# Patient Record
Sex: FEMALE | Race: WHITE | Employment: FULL TIME | URBAN - NONMETROPOLITAN AREA
[De-identification: names, ages, dates, MRNs, and addresses within clinical notes are randomized per-mention and may not be internally consistent; named-entity substitution may affect disease eponyms.]

---

## 2017-07-12 ENCOUNTER — OFFICE VISIT (OUTPATIENT)
Dept: GASTROENTEROLOGY | Age: 57
End: 2017-07-12
Payer: OTHER GOVERNMENT

## 2017-07-12 VITALS
WEIGHT: 151 LBS | DIASTOLIC BLOOD PRESSURE: 80 MMHG | HEART RATE: 72 BPM | BODY MASS INDEX: 26.75 KG/M2 | HEIGHT: 63 IN | SYSTOLIC BLOOD PRESSURE: 120 MMHG | OXYGEN SATURATION: 96 %

## 2017-07-12 DIAGNOSIS — Z80.0 FAMILY HISTORY OF COLON CANCER: Primary | ICD-10-CM

## 2017-07-12 DIAGNOSIS — Z86.010 HISTORY OF COLON POLYPS: ICD-10-CM

## 2017-07-12 PROBLEM — Z86.0100 HISTORY OF COLON POLYPS: Status: ACTIVE | Noted: 2017-07-12

## 2017-07-12 PROCEDURE — 99214 OFFICE O/P EST MOD 30 MIN: CPT | Performed by: NURSE PRACTITIONER

## 2017-07-12 ASSESSMENT — ENCOUNTER SYMPTOMS
SORE THROAT: 0
VOICE CHANGE: 0
COUGH: 0
CONSTIPATION: 0
SHORTNESS OF BREATH: 0
CHEST TIGHTNESS: 0
BACK PAIN: 0
VOMITING: 0
DIARRHEA: 0
BLOOD IN STOOL: 0
ABDOMINAL DISTENTION: 0
NAUSEA: 0
ABDOMINAL PAIN: 0
RECTAL PAIN: 0

## 2019-11-05 ENCOUNTER — OFFICE VISIT (OUTPATIENT)
Dept: OTOLARYNGOLOGY | Facility: CLINIC | Age: 59
End: 2019-11-05

## 2019-11-05 VITALS
OXYGEN SATURATION: 97 % | WEIGHT: 144.8 LBS | BODY MASS INDEX: 25.66 KG/M2 | SYSTOLIC BLOOD PRESSURE: 88 MMHG | HEIGHT: 63 IN | RESPIRATION RATE: 18 BRPM | DIASTOLIC BLOOD PRESSURE: 60 MMHG | HEART RATE: 80 BPM | TEMPERATURE: 98.4 F

## 2019-11-05 DIAGNOSIS — J35.1 LINGUAL TONSIL HYPERTROPHY: ICD-10-CM

## 2019-11-05 DIAGNOSIS — R09.89 GLOBUS PHARYNGEUS: ICD-10-CM

## 2019-11-05 DIAGNOSIS — R43.9 SMELL AND TASTE DISORDER: Primary | ICD-10-CM

## 2019-11-05 PROCEDURE — 31575 DIAGNOSTIC LARYNGOSCOPY: CPT | Performed by: OTOLARYNGOLOGY

## 2019-11-05 PROCEDURE — 99204 OFFICE O/P NEW MOD 45 MIN: CPT | Performed by: OTOLARYNGOLOGY

## 2019-11-05 RX ORDER — ASPIRIN 81 MG/1
81 TABLET ORAL DAILY
COMMUNITY

## 2019-11-05 RX ORDER — OXYMETAZOLINE HYDROCHLORIDE 0.05 G/100ML
2 SPRAY NASAL 3 TIMES DAILY
Qty: 2 ML | Refills: 0 | Status: SHIPPED | OUTPATIENT
Start: 2019-11-05 | End: 2019-11-08

## 2019-11-05 RX ORDER — FLUTICASONE PROPIONATE 50 MCG
2 SPRAY, SUSPENSION (ML) NASAL 2 TIMES DAILY
Qty: 48 G | Refills: 3 | Status: SHIPPED | OUTPATIENT
Start: 2019-11-05

## 2019-11-05 NOTE — PROGRESS NOTES
Jose L Contreras Jr, MD     ENT PROCEDURE NOTE     Anesthesia: topical 4% tetracaine and oxymetazoline mix    Endoscopy Type: Flexible Laryngoscopy    Indications for Procedure:     Procedure Details:    The patient was placed in an upright position.  The laryngoscope was passed bilateral nasal passge.  The nasal cavities, nasopharynx, oropharynx, hypopharynx, and larynx were all examined.  Vocal cords were examined during respiration and phonation.  The following findings were noted:    Findings:   NASAL SCOPE FINDINGS:    Nasal endoscopy   NASALMUCOSA:    abnormal:        Bilateral- mildly bluish and boggy    NASAL PASSAGES:     abnormal   Bilateral- L>R narrowed    NASAL VALVE:     abnormal  Bilateral- mildly weak    SEPTUM:     mucosa abnormal   Bilateral- bluish, thin     deviation present:      deviated Left low moderate, more posterior   INFERIOR TURBINATES:     abnormal  Bilateral- mildly bluish and boggy    MIDDLE TURBINATES:     normal size, non-obstructing, no lesions, middle meatus non-obstructed, no lesions/polyps   MIDDLE MEATUS:      Normal, patent, no mucopus, non-surgical  Bilateral   SPHENO-ETHMOID RECESS:    normal, normal sphenoid ostia. no mucopus, non-surgical   NASOPHARYNX:     normal mucosa, normal secretions, Eustachian tubes normal, normal palate mobility, no lesions, adenoids appear normal   SECRETIONS:     normal amount, clear  LARYNGOSCOPY FINDINGS :    OROPHARYNX:     Lateral walls:         Redundant          BILATERAL    Posterior walls:         normal, no lesions    LINGUAL TONSILS:          BILATERAL: very enlarged and touching epiglottis    VALLECULA:        BILATERAL: tonsils blocking  EPIGLOTTIS:    Position: retroverted    Color: pale    Mucosa: mildly bluish    Shape: normal  HYPOPHARYNX:    Lateral walls:         BILATERAL: redundant    Posterior wall:        BILATERAL: mildly thick  ARYEPIGLOTTIC FOLDS:     normal mucosa, no lesions  ARYTENOIDS:    normal position, normal  size, normal mobility, no lesions, tower size normal  FALSE CORDS:     normal mucosa, no lesions, normal mobility  Bilateral  TRUE VOCAL FOLDS:      normal appearance, mobility, no lesions  Bilateral  GLOTTIS:     normal closure, with good cord apposition  SUBGLOTTIS:     unobstructed, patent, no lesions    Condition:  Stable.  Patient tolerated procedure well.    Complications:  None    Post-procedure instructions reviewed with Patient  Instructions documented in AVS for patient to review    Jose L Contreras Jr, MD  11/5/2019  4:03 PM

## 2019-11-05 NOTE — PROGRESS NOTES
Tammi Elmore MA   Patient Intake Note    Review of Systems  Review of Systems   Constitutional: Negative for chills, fatigue and fever.   HENT:        See HPI   Eyes: Negative for pain, discharge, redness and itching.   Respiratory: Positive for cough. Negative for choking and wheezing.    Gastrointestinal: Negative for diarrhea, nausea and vomiting.   Musculoskeletal: Positive for neck pain. Negative for neck stiffness.   Allergic/Immunologic: Positive for environmental allergies. Negative for food allergies.   Neurological: Positive for dizziness and headaches. Negative for weakness and light-headedness.   Hematological: Does not bruise/bleed easily.   Psychiatric/Behavioral: Positive for sleep disturbance.   All other systems reviewed and are negative.      QUALITY MEASURES    Tobacco Use: Screening and Cessation Intervention  Social History    Tobacco Use      Smoking status: Never Smoker      Smokeless tobacco: Never Used        Tammi Elmore MA  11/5/2019  3:44 PM

## 2019-11-05 NOTE — PATIENT INSTRUCTIONS
NASAL SALINE:  Use 2 puffs each nostril 4-6 times daily and more frequently if possible.  You can buy saline spray or you can make your own and use an old spray bottle to administer  Use a humidifier at bedside  Recipe for saline:d  Water                                 1 quart  Salt (table)                        1 tablespoon  Gylcerin (or Naty Syrup)    1 teaspoon  Sodium bicarbonate           1 teaspoon  Sprays or [place] pots are recommended    Nasal steroid use:  Using nasal steroids:  You will be prescribed one of the following nasal steroids: Flonase, Nasacort, Nasonex, Rhinocort, Qnasl, Zetonna  2 puffs each nostril 2 times daily  Start as soon as possible  If you are using Afrin for 3 days with the nasal steroid,  Use Afrin first and wait 10 minutes to allow the nose to open. Then administer nasal steroids.    Afrin use:  Use 2 puffs each nostril 3 times daily for 3 days only!!  Stop using after 3 days unless instructed to use longer      VITAMINS FOR SMELL AND TASTE:  Vitamin C 1 gram 2 times daily  Vit B complex 1 tablet 2 times daily  Zinc 50 mg 2 times daily    CT scan of sinus ordered    Thank you for enrolling in [facility]. Please follow the instructions below to securely access your online medical record. [facility] allows you to send messages to your doctor, view your test results, renew your prescriptions, schedule appointments, and more.    How Do I Sign Up?  1. In your Internet browser, go to [employer]  2. Click on the Sign Up Now link in the New User? box.   3. Enter your [facility] Activation Code exactly as it appears below. You will not need to use this code after you have completed the sign-up process. If you do not sign up before the expiration date, you must request a new code.  [facility] Activation Code: [email or web address]  Expires: 12/5/2019  4:05 PM    4. Enter the last four digits of your Social Security Number and your Date of Birth as indicated and click Next. You will be taken  to the next sign-up page.  5. Create a [email or web address] username. Think of one that is secure and easy to remember.  6. Create a [email or web address] password. You can change your password at any time.  7. Choose a security question, enter your answer, and click Next. This can be used to access [email or web address] if you forget your password.   8. Select your communication preference. Enter a valid e-mail address if you would like to receive e-mail notifications when new information is available in [email or web address].  9. Click Sign In. You can now view your medical record.     Additional Information  If you have questions, you can e-mail [email or web address], or call [phone] to talk to our [email or web address] staff. Remember, [email or web address] is NOT to be used for urgent needs. For medical emergencies, dial 911.

## 2019-11-05 NOTE — PROGRESS NOTES
Jose L Contreras Jr, MD     Chief Complaint   Patient presents with   • Swollen Glands     Pt has swollen glands and has lost taste and smell.        HISTORY OF PRESENT ILLNESS:   Accompanied by:   No one  Karuna Green is a  59 y.o. female with loss of smell and taste since tooth pulled in July.  She had tooth pulled. She has not discussed with dentist since dental surgery.  Noted loss of smell and taste immediately after procedure. She has not had return since then.  No change.  Tooth has healed.   She cannot smell Bovie smoke.  Weight- gaining.  Rx- none  She is frustrated.  Smoke- none  Drink- none  Healthy- no issues.  No blood thinners except 81 mg ASA daily.  Patient was sick 1 week prior to seeing dentist. She noted taste prior to the procedure but none after procedure.    Review of Systems  Reviewed per patient intake note and confirmed with patient    Past History:  Past Medical History:   Diagnosis Date   • Arthritis      Past Surgical History:   Procedure Laterality Date   • HYSTERECTOMY     • TUBAL ABDOMINAL LIGATION       Family History   Problem Relation Age of Onset   • Heart disease Mother    • COPD Mother    • Diabetes Mother    • Colon cancer Father    • Liver cancer Father    • Lung cancer Father      Social History     Tobacco Use   • Smoking status: Never Smoker   • Smokeless tobacco: Never Used   Substance Use Topics   • Alcohol use: No     Frequency: Never   • Drug use: No     Outpatient Medications Marked as Taking for the 11/5/19 encounter (Office Visit) with Jose L Contreras Jr., MD   Medication Sig Dispense Refill   • aspirin 81 MG EC tablet Take 81 mg by mouth Daily.     • DICLOFENAC SODIUM PO Take 15 mg by mouth 2 (Two) Times a Day. Takes 1 tablet daily.       Allergies:  Butorphanol; Codeine; Lisinopril; Morphine; and Other        Vital Signs:   Temp:  [98.4 °F (36.9 °C)] 98.4 °F (36.9 °C)  Heart Rate:  [80] 80  Resp:  [18] 18  BP: (88)/(60) 88/60    EXAMINATION:   CONSTITUTIONAL:     well nourished, well-developed, alert, oriented, in no acute distress     BODY HABITUS:    Normal body habitus    COMMUNICATION:    able to communicate normally, normal voice quality    HEAD:     Normocephalic, without obvious abnormality, atraumatic    FACE:    structure normal, no tenderness present, no lesions/masses, no evidence of trauma    SALIVARY GLANDS:    parotid glands with no tenderness, no swelling, no masses, submandibular glands with normal size, nontender     EYE:    ocular motility normal, eyelids normal, orbits normal, no proptosis, conjunctiva clear, sclera non-icteric, pupils equal, round, reactive to light and accomodation    HEARING:    response to conversational voice normal    EARS:    Otoscopic exam   normal pinna with no lesions, Canals normal size and shape, Tympanic membranes normal, Ossicular chain intact, Middle ear clear     NOSE EXTERNAL:    APPEARANCE: normal, straight, with good projection, no tenderness, no lesions, no tenderness, good nasal support, patent nares    NOSE INTERNAL:    Nasal endoscopy        See procedure note for details Bilateral    ORAL CAVITY:    Normal lips with no lesions, dentition normal for age, FOM intact without lesions and normal salivary flow, Mucosa intact without lesions, Hard and soft palate normal without lesions      Repair: poor, worn, yellow, broken stained    OROPHARYNX:    Direct examination  oropharyngeal mucosa normal, tonsil(s) with normal appearance      NECK:    normal appearance, no masses, no lesions, larynx normal mobility, trachea midline  thin    LYMPH NODES :    no adenopathy    THYROID:    no overt thyromegaly, no tenderness, nodules or mass present on palpation, position midline    CHEST/RESPIRATORY:    respiratory effort normal, no rales, rubs or wheezing, no stridor, normal appearance to chest    CARDIOVASCULAR:    regular rate and rhythm, no murmurs, gallups, no peripheral edema    NEURO/PSYCHIATRIC :    oriented appropriately  for age, mood normal, affect appropriate, cranial nerves intact grossly (unless specifically described), gait normal for age    RESULTS REVIEW:    I have reviewed the patients old records in the chart.        ASSESSMENT:      Diagnosis Plan   1. Smell and taste disorder  CT Sinus Without Contrast    sudden loss greater than 4 months ago  Viral induced sense of smell loss prior to procedure   2. Globus pharyngeus      Pushes on R carotid body   3. Lingual tonsil hypertrophy      Bilateral moderate           PLAN:     Conservative management.  Patient ha s2 issues. I feel URI prior to dental procedure caused the loss of smell. I will start nasal steroids and obtain CT to assess smell.  She has globus, enlarged lingual tonsils and prominent R carotid body. This is causing sensation in R throat.  I will se if nasal steroids help for now.  I will start Smell Vitamin protocol.  Flonase BID  Afrin x 3 days  Nasal saline  Smell vitamins  Ct ordered  MY CHART:  Patient is Encouraged to enroll in My Chart  Encouraged to review data and findings in My Chart  New Medications Ordered This Visit   Medications   • fluticasone (FLONASE) 50 MCG/ACT nasal spray     Si sprays into the nostril(s) as directed by provider 2 (Two) Times a Day.     Dispense:  48 g     Refill:  3   • oxymetazoline (AFRIN) 0.05 % nasal spray     Si sprays into the nostril(s) as directed by provider 3 (Three) Times a Day for 3 days. Use for 3 days then stop     Dispense:  2 mL     Refill:  0     Orders Placed This Encounter   Procedures   • CT Sinus Without Contrast          Patient understand(s) and agree(s) with the treatment plan as described.  Return After CT sinus, for Recheck smell, globus, lingual tonsils, Flex scope.       Jose L Contreras Jr, MD  19  4:02 PM

## 2019-11-13 ENCOUNTER — HOSPITAL ENCOUNTER (OUTPATIENT)
Dept: CT IMAGING | Facility: HOSPITAL | Age: 59
Discharge: HOME | End: 2019-11-13
Admitting: OTOLARYNGOLOGY

## 2019-11-13 DIAGNOSIS — R43.9 SMELL AND TASTE DISORDER: ICD-10-CM

## 2019-11-13 PROCEDURE — 70486 CT MAXILLOFACIAL W/O DYE: CPT

## 2019-12-04 ENCOUNTER — OFFICE VISIT (OUTPATIENT)
Dept: OTOLARYNGOLOGY | Facility: CLINIC | Age: 59
End: 2019-12-04

## 2019-12-04 VITALS
DIASTOLIC BLOOD PRESSURE: 90 MMHG | RESPIRATION RATE: 18 BRPM | OXYGEN SATURATION: 96 % | TEMPERATURE: 98.6 F | SYSTOLIC BLOOD PRESSURE: 132 MMHG | HEART RATE: 73 BPM | HEIGHT: 63 IN | WEIGHT: 145.2 LBS | BODY MASS INDEX: 25.73 KG/M2

## 2019-12-04 DIAGNOSIS — J35.1 LINGUAL TONSIL HYPERTROPHY: ICD-10-CM

## 2019-12-04 DIAGNOSIS — R43.9 SMELL AND TASTE DISORDER: Primary | ICD-10-CM

## 2019-12-04 DIAGNOSIS — J32.8 OTHER CHRONIC SINUSITIS: ICD-10-CM

## 2019-12-04 DIAGNOSIS — R09.89 GLOBUS PHARYNGEUS: ICD-10-CM

## 2019-12-04 PROCEDURE — 99213 OFFICE O/P EST LOW 20 MIN: CPT | Performed by: OTOLARYNGOLOGY

## 2019-12-04 RX ORDER — ASCORBIC ACID 500 MG
500 TABLET ORAL 2 TIMES DAILY
COMMUNITY

## 2019-12-04 NOTE — PROGRESS NOTES
Tammi Elmore MA   Patient Intake Note    Review of Systems  Review of Systems   Constitutional: Negative for chills and fever.   HENT:        See HPI    Respiratory: Positive for cough. Negative for shortness of breath.    Allergic/Immunologic: Negative for environmental allergies and food allergies.   Neurological: Positive for headaches. Negative for dizziness.   Psychiatric/Behavioral: Negative for sleep disturbance.   All other systems reviewed and are negative.      Tobacco Use: Screening and Cessation Intervention  Social History    Tobacco Use      Smoking status: Never Smoker      Smokeless tobacco: Never Used        Tammi Elmore MA  12/4/2019  4:22 PM

## 2019-12-04 NOTE — PROGRESS NOTES
Jose L Contreras Jr, MD     FOLLOW UP NOTE     Chief Complaint   Patient presents with   • Smell and taste disorder     4 week follow-up.        HISTORY OF PRESENT ILLNESS:   Accompanied by:  No one  Karuna Green is a  59 y.o. female who is here for follow up. She has had 2 episodes of smell. She smelled Halothane and burning leaves.  She smelled pepper.  She still cannot taste food. She is depending on texture.  She is not losing weight.  She is using Flonase BID  Saline- using as much as possible.  She is using Smell vitamins    Review of Systems  Reviewed per patient intake note and confirmed by me    Past History:  Past medical and surgical history, family history and social history reviewed and updated when appropriate.  Current medications and allergies reviewed and updated when appropriate.  Allergies:  Butorphanol; Codeine; Lisinopril; Morphine; and Other        Vital Signs:   Temp:  [98.6 °F (37 °C)] 98.6 °F (37 °C)  Heart Rate:  [73] 73  Resp:  [18] 18  BP: (132)/(90) 132/90    EXAMINATION:   CONSTITUTIONAL:    well nourished, well-developed, alert, oriented, in no acute distress      BODY HABITUS:    Normal body habitus     COMMUNICATION:    able to communicate normally, normal voice quality     HEAD:     Normocephalic, without obvious abnormality, atraumatic     FACE:    structure normal, no tenderness present, no lesions/masses, no evidence of trauma     SALIVARY GLANDS:    parotid glands with no tenderness, no swelling, no masses, submandibular glands with normal size, nontender      EYE:    ocular motility normal, eyelids normal, orbits normal, no proptosis, conjunctiva clear, sclera non-icteric, pupils equal, round, reactive to light and accomodation     HEARING:    response to conversational voice normal     EARS:    Otoscopic exam   normal pinna with no lesions, Canals normal size and shape, Tympanic membranes normal, Ossicular chain intact, Middle ear clear     NOSE EXTERNAL:    APPEARANCE: normal,  straight, with good projection, no tenderness, no lesions, no tenderness, good nasal support, patent nares     NOSE INTERNAL:    Anterior rhinoscopy   NASALMUCOSA:    abnormal:        Bilateral- mildly bluish and boggy    NASAL PASSAGES:     abnormal   Bilateral- L>R narrowed    NASAL VALVE:     abnormal  Bilateral- mildly weak    SEPTUM:     mucosa abnormal   Bilateral- bluish, thin     deviation present:      deviated Left low moderate, more posterior   INFERIOR TURBINATES:     abnormal  Bilateral- mildly bluish and boggy      ORAL CAVITY:    Normal lips with no lesions, dentition normal for age, FOM intact without lesions and normal salivary flow, Mucosa intact without lesions, Hard and soft palate normal without lesions      Repair: poor, worn, yellow, broken stained     OROPHARYNX:    Direct examination  oropharyngeal mucosa normal, tonsil(s) with normal appearance       NECK:    normal appearance, no masses, no lesions, larynx normal mobility, trachea midline  thin     LYMPH NODES :    no adenopathy     THYROID:    no overt thyromegaly, no tenderness, nodules or mass present on palpation, position midline     CHEST/RESPIRATORY:    respiratory effort normal, no rales, rubs or wheezing, no stridor, normal appearance to chest     CARDIOVASCULAR:    regular rate and rhythm, no murmurs, gallups, no peripheral edema     NEURO/PSYCHIATRIC :    oriented appropriately for age, mood normal, affect appropriate, cranial nerves intact grossly (unless specifically described), gait normal for age    RESULTS REVIEW:    I have personally reviewed the patient's ct of the sinus images.  I have personally reviewed the patient's ct scan of the sinuses without contrast report.    CT Sinus 11/5/2019       ASSESSMENT:      Diagnosis Plan   1. Smell and taste disorder      Some changes, min return   2. Globus pharyngeus     3. Lingual tonsil hypertrophy     4. Other chronic sinusitis      R Petra bullosa  Mild Max sinsus and [facility]  thickeing  L sphenoid one cell opacified           PLAN:   Continue current management plan.  patient has some min response to medications. I will continue observation for now. I do not feel sinus findings warrant surgery, without much hope to restore sense of smell.  I will have her cont nasal steroids and smell vit regimen.  She still has globus.  Flonase BID  Nasal saline  Smell Vits- continue  MY CHART:  Patient is Patient is using My Chart          Patient understand(s) and agree(s) with the treatment plan as described.    Return in about 3 months (around 3/4/2020) for Recheck Smell.     Jose L Contreras Jr, MD  12/04/19  4:41 PM

## 2019-12-04 NOTE — PATIENT INSTRUCTIONS
Continue Flonase, nasal saline, Smell Vitamins    [email or web address]

## 2020-01-15 ENCOUNTER — NURSE TRIAGE (OUTPATIENT)
Dept: OTHER | Facility: CLINIC | Age: 60
End: 2020-01-15

## 2020-02-21 ENCOUNTER — HOSPITAL ENCOUNTER (OUTPATIENT)
Dept: GENERAL RADIOLOGY | Facility: HOSPITAL | Age: 60
Discharge: HOME | End: 2020-02-21
Admitting: NURSE PRACTITIONER

## 2020-02-21 ENCOUNTER — TRANSCRIBE ORDERS (OUTPATIENT)
Dept: GENERAL RADIOLOGY | Facility: HOSPITAL | Age: 60
End: 2020-02-21

## 2020-02-21 DIAGNOSIS — Z78.0 POST-MENOPAUSAL: ICD-10-CM

## 2020-02-21 DIAGNOSIS — Z78.0 POST-MENOPAUSAL: Primary | ICD-10-CM

## 2020-02-21 PROCEDURE — 77080 DXA BONE DENSITY AXIAL: CPT

## 2020-05-01 ENCOUNTER — OFFICE VISIT (OUTPATIENT)
Age: 60
End: 2020-05-01

## 2020-05-01 VITALS — OXYGEN SATURATION: 95 % | TEMPERATURE: 99.4 F

## 2020-05-01 PROCEDURE — 99999 PR OFFICE/OUTPT VISIT,PROCEDURE ONLY: CPT | Performed by: NURSE PRACTITIONER

## 2020-05-01 NOTE — PATIENT INSTRUCTIONS
departments. [employer] allows you to send messages to your doctor, view your test results, renew your prescriptions, schedule appointments, view visit notes, and more. How Do I Sign Up? 1. In your Internet browser, go to [email or web address]  2. Click on the Sign Up Now link in the Sign In box. You will see the New Member Sign Up page. 3. Enter your [employer] Access Code exactly as it appears below. You will not need to use this code after youve completed the sign-up process. If you do not sign up before the expiration date, you must request a new code. [employer] Access Code: [email or web address]  Expires: 6/15/2020  3:07 PM    4. Enter your Social Security Number ([id number]) and Date of Birth (mm/dd/yyyy) as indicated and click Submit. You will be taken to the next sign-up page. 5. Create a [employer] ID. This will be your [employer] login ID and cannot be changed, so think of one that is secure and easy to remember. 6. Create a [employer] password. You can change your password at any time. 7. Enter your Password Reset Question and Answer. This can be used at a later time if you forget your password. 8. Enter your e-mail address. You will receive e-mail notification when new information is available in [address]. 9. Click Sign Up. You can now view your medical record. Additional Information  If you have questions, please contact the physician practice where you receive care. Remember, [employer] is NOT to be used for urgent needs. For medical emergencies, dial 911. For questions regarding your [employer] account call [phone]. If you have a clinical question, please call your doctor's office.

## 2020-05-04 ENCOUNTER — TELEPHONE (OUTPATIENT)
Age: 60
End: 2020-05-04

## 2020-05-04 LAB
REPORT: NORMAL
SARS-COV-2: NOT DETECTED
THIS TEST SENT TO: NORMAL

## 2020-05-04 NOTE — TELEPHONE ENCOUNTER
----- Message from SHINE Llanos sent at 5/4/2020  4:24 PM CDT -----  Please notify patient of normal result.

## 2020-05-04 NOTE — TELEPHONE ENCOUNTER
This [facility] called and gave the patient their negative covid results. Patient stated understanding.

## 2020-12-29 ENCOUNTER — HOSPITAL ENCOUNTER (OUTPATIENT)
Dept: PREADMISSION TESTING | Age: 60
Discharge: HOME | End: 2021-01-02
Payer: COMMERCIAL

## 2020-12-29 VITALS — HEIGHT: 63 IN | BODY MASS INDEX: 24.8 KG/M2 | WEIGHT: 140 LBS

## 2020-12-29 LAB
ANION GAP SERPL CALCULATED.3IONS-SCNC: 10 MMOL/L (ref 7–19)
BASOPHILS ABSOLUTE: 0.1 K/UL (ref 0–0.2)
BASOPHILS RELATIVE PERCENT: 1.1 % (ref 0–1)
BUN BLDV-MCNC: 22 MG/DL (ref 8–23)
CALCIUM SERPL-MCNC: 9.5 MG/DL (ref 8.8–10.2)
CHLORIDE BLD-SCNC: 102 MMOL/L (ref 98–111)
CO2: 28 MMOL/L (ref 22–29)
CREAT SERPL-MCNC: 0.6 MG/DL (ref 0.5–0.9)
EKG P AXIS: 79 DEGREES
EKG P-R INTERVAL: 134 MS
EKG Q-T INTERVAL: 378 MS
EKG QRS DURATION: 88 MS
EKG QTC CALCULATION (BAZETT): 406 MS
EKG T AXIS: 70 DEGREES
EOSINOPHILS ABSOLUTE: 0.3 K/UL (ref 0–0.6)
EOSINOPHILS RELATIVE PERCENT: 2.7 % (ref 0–5)
GFR AFRICAN AMERICAN: >59
GFR NON-AFRICAN AMERICAN: >60
GLUCOSE BLD-MCNC: 105 MG/DL (ref 74–109)
HCT VFR BLD CALC: 45.6 % (ref 37–47)
HEMOGLOBIN: 14.6 G/DL (ref 12–16)
IMMATURE GRANULOCYTES #: 0 K/UL
LYMPHOCYTES ABSOLUTE: 2.6 K/UL (ref 1.1–4.5)
LYMPHOCYTES RELATIVE PERCENT: 28.2 % (ref 20–40)
MCH RBC QN AUTO: 31.1 PG (ref 27–31)
MCHC RBC AUTO-ENTMCNC: 32 G/DL (ref 33–37)
MCV RBC AUTO: 97.2 FL (ref 81–99)
MONOCYTES ABSOLUTE: 0.6 K/UL (ref 0–0.9)
MONOCYTES RELATIVE PERCENT: 6.9 % (ref 0–10)
NEUTROPHILS ABSOLUTE: 5.7 K/UL (ref 1.5–7.5)
NEUTROPHILS RELATIVE PERCENT: 60.8 % (ref 50–65)
PDW BLD-RTO: 12.8 % (ref 11.5–14.5)
PLATELET # BLD: 299 K/UL (ref 130–400)
PMV BLD AUTO: 11 FL (ref 9.4–12.3)
POTASSIUM SERPL-SCNC: 4.1 MMOL/L (ref 3.5–5)
RBC # BLD: 4.69 M/UL (ref 4.2–5.4)
SODIUM BLD-SCNC: 140 MMOL/L (ref 136–145)
WBC # BLD: 9.3 K/UL (ref 4.8–10.8)

## 2020-12-29 PROCEDURE — 85025 COMPLETE CBC W/AUTO DIFF WBC: CPT

## 2020-12-29 PROCEDURE — 80048 BASIC METABOLIC PNL TOTAL CA: CPT

## 2020-12-29 PROCEDURE — 93010 ELECTROCARDIOGRAM REPORT: CPT | Performed by: INTERNAL MEDICINE

## 2020-12-29 PROCEDURE — 93005 ELECTROCARDIOGRAM TRACING: CPT | Performed by: ORTHOPAEDIC SURGERY

## 2020-12-29 RX ORDER — ALENDRONATE SODIUM 70 MG/1
70 TABLET ORAL
COMMUNITY

## 2020-12-29 RX ORDER — LOSARTAN POTASSIUM 50 MG/1
50 TABLET ORAL DAILY
COMMUNITY

## 2020-12-29 RX ORDER — HYDROCHLOROTHIAZIDE 12.5 MG/1
12.5 CAPSULE, GELATIN COATED ORAL DAILY
COMMUNITY

## 2021-01-02 ENCOUNTER — OFFICE VISIT (OUTPATIENT)
Age: 61
End: 2021-01-02

## 2021-01-02 VITALS — OXYGEN SATURATION: 97 % | TEMPERATURE: 97.4 F | HEART RATE: 84 BPM

## 2021-01-02 DIAGNOSIS — Z11.59 SCREENING FOR VIRAL DISEASE: Primary | ICD-10-CM

## 2021-01-02 LAB — SARS-COV-2, PCR: NOT DETECTED

## 2021-01-02 PROCEDURE — 99999 PR OFFICE/OUTPT VISIT,PROCEDURE ONLY: CPT | Performed by: NURSE PRACTITIONER

## 2021-01-04 ENCOUNTER — HOSPITAL ENCOUNTER (OUTPATIENT)
Age: 61
Setting detail: OUTPATIENT SURGERY
Discharge: HOME | End: 2021-01-04
Attending: ORTHOPAEDIC SURGERY | Admitting: ORTHOPAEDIC SURGERY
Payer: COMMERCIAL

## 2021-01-04 ENCOUNTER — ANESTHESIA EVENT (OUTPATIENT)
Dept: OPERATING ROOM | Age: 61
End: 2021-01-04
Payer: COMMERCIAL

## 2021-01-04 ENCOUNTER — ANESTHESIA (OUTPATIENT)
Dept: OPERATING ROOM | Age: 61
End: 2021-01-04
Payer: COMMERCIAL

## 2021-01-04 VITALS
RESPIRATION RATE: 6 BRPM | DIASTOLIC BLOOD PRESSURE: 53 MMHG | SYSTOLIC BLOOD PRESSURE: 117 MMHG | TEMPERATURE: 97 F | OXYGEN SATURATION: 97 %

## 2021-01-04 VITALS
BODY MASS INDEX: 24.8 KG/M2 | DIASTOLIC BLOOD PRESSURE: 68 MMHG | WEIGHT: 140 LBS | SYSTOLIC BLOOD PRESSURE: 137 MMHG | RESPIRATION RATE: 14 BRPM | OXYGEN SATURATION: 92 % | HEART RATE: 58 BPM | HEIGHT: 63 IN | TEMPERATURE: 97.7 F

## 2021-01-04 DIAGNOSIS — Z98.890 S/P RIGHT KNEE ARTHROSCOPY: Primary | ICD-10-CM

## 2021-01-04 PROCEDURE — 7100000001 HC PACU RECOVERY - ADDTL 15 MIN: Performed by: ORTHOPAEDIC SURGERY

## 2021-01-04 PROCEDURE — 2580000003 HC RX 258: Performed by: ANESTHESIOLOGY

## 2021-01-04 PROCEDURE — 3700000001 HC ADD 15 MINUTES (ANESTHESIA): Performed by: ORTHOPAEDIC SURGERY

## 2021-01-04 PROCEDURE — 2709999900 HC NON-CHARGEABLE SUPPLY: Performed by: ORTHOPAEDIC SURGERY

## 2021-01-04 PROCEDURE — 2500000003 HC RX 250 WO HCPCS: Performed by: ANESTHESIOLOGY

## 2021-01-04 PROCEDURE — 2500000003 HC RX 250 WO HCPCS: Performed by: NURSE ANESTHETIST, CERTIFIED REGISTERED

## 2021-01-04 PROCEDURE — 3600000004 HC SURGERY LEVEL 4 BASE: Performed by: ORTHOPAEDIC SURGERY

## 2021-01-04 PROCEDURE — 7100000010 HC PHASE II RECOVERY - FIRST 15 MIN: Performed by: ORTHOPAEDIC SURGERY

## 2021-01-04 PROCEDURE — 6370000000 HC RX 637 (ALT 250 FOR IP): Performed by: ORTHOPAEDIC SURGERY

## 2021-01-04 PROCEDURE — 6360000002 HC RX W HCPCS: Performed by: NURSE ANESTHETIST, CERTIFIED REGISTERED

## 2021-01-04 PROCEDURE — 6360000002 HC RX W HCPCS: Performed by: ANESTHESIOLOGY

## 2021-01-04 PROCEDURE — 3600000014 HC SURGERY LEVEL 4 ADDTL 15MIN: Performed by: ORTHOPAEDIC SURGERY

## 2021-01-04 PROCEDURE — 2720000010 HC SURG SUPPLY STERILE: Performed by: ORTHOPAEDIC SURGERY

## 2021-01-04 PROCEDURE — 2580000003 HC RX 258: Performed by: ORTHOPAEDIC SURGERY

## 2021-01-04 PROCEDURE — 6370000000 HC RX 637 (ALT 250 FOR IP): Performed by: ANESTHESIOLOGY

## 2021-01-04 PROCEDURE — 6360000002 HC RX W HCPCS: Performed by: ORTHOPAEDIC SURGERY

## 2021-01-04 PROCEDURE — 3700000000 HC ANESTHESIA ATTENDED CARE: Performed by: ORTHOPAEDIC SURGERY

## 2021-01-04 PROCEDURE — 7100000000 HC PACU RECOVERY - FIRST 15 MIN: Performed by: ORTHOPAEDIC SURGERY

## 2021-01-04 PROCEDURE — 7100000011 HC PHASE II RECOVERY - ADDTL 15 MIN: Performed by: ORTHOPAEDIC SURGERY

## 2021-01-04 RX ORDER — ONDANSETRON 4 MG/1
4 TABLET, FILM COATED ORAL DAILY PRN
Qty: 20 TABLET | Refills: 0 | Status: SHIPPED | OUTPATIENT
Start: 2021-01-04 | End: 2022-02-15

## 2021-01-04 RX ORDER — SODIUM CHLORIDE, SODIUM LACTATE, POTASSIUM CHLORIDE, CALCIUM CHLORIDE 600; 310; 30; 20 MG/100ML; MG/100ML; MG/100ML; MG/100ML
INJECTION, SOLUTION INTRAVENOUS CONTINUOUS
Status: DISCONTINUED | OUTPATIENT
Start: 2021-01-04 | End: 2021-01-04 | Stop reason: HOSPADM

## 2021-01-04 RX ORDER — HYDROMORPHONE HYDROCHLORIDE 1 MG/ML
0.5 INJECTION, SOLUTION INTRAMUSCULAR; INTRAVENOUS; SUBCUTANEOUS EVERY 5 MIN PRN
Status: DISCONTINUED | OUTPATIENT
Start: 2021-01-04 | End: 2021-01-04 | Stop reason: HOSPADM

## 2021-01-04 RX ORDER — LIDOCAINE HYDROCHLORIDE 10 MG/ML
INJECTION, SOLUTION INFILTRATION; PERINEURAL PRN
Status: DISCONTINUED | OUTPATIENT
Start: 2021-01-04 | End: 2021-01-04 | Stop reason: SDUPTHER

## 2021-01-04 RX ORDER — HYDROCODONE BITARTRATE AND ACETAMINOPHEN 10; 325 MG/1; MG/1
TABLET ORAL
Qty: 30 TABLET | Refills: 0 | Status: SHIPPED | OUTPATIENT
Start: 2021-01-04 | End: 2021-01-11

## 2021-01-04 RX ORDER — FENTANYL CITRATE 50 UG/ML
INJECTION, SOLUTION INTRAMUSCULAR; INTRAVENOUS PRN
Status: DISCONTINUED | OUTPATIENT
Start: 2021-01-04 | End: 2021-01-04 | Stop reason: SDUPTHER

## 2021-01-04 RX ORDER — SODIUM CHLORIDE 0.9 % (FLUSH) 0.9 %
10 SYRINGE (ML) INJECTION PRN
Status: DISCONTINUED | OUTPATIENT
Start: 2021-01-04 | End: 2021-01-04 | Stop reason: HOSPADM

## 2021-01-04 RX ORDER — FENTANYL CITRATE 50 UG/ML
50 INJECTION, SOLUTION INTRAMUSCULAR; INTRAVENOUS EVERY 5 MIN PRN
Status: DISCONTINUED | OUTPATIENT
Start: 2021-01-04 | End: 2021-01-04 | Stop reason: HOSPADM

## 2021-01-04 RX ORDER — SCOLOPAMINE TRANSDERMAL SYSTEM 1 MG/1
1 PATCH, EXTENDED RELEASE TRANSDERMAL
Status: DISCONTINUED | OUTPATIENT
Start: 2021-01-04 | End: 2021-01-04 | Stop reason: HOSPADM

## 2021-01-04 RX ORDER — HYDROMORPHONE HYDROCHLORIDE 1 MG/ML
0.25 INJECTION, SOLUTION INTRAMUSCULAR; INTRAVENOUS; SUBCUTANEOUS EVERY 5 MIN PRN
Status: DISCONTINUED | OUTPATIENT
Start: 2021-01-04 | End: 2021-01-04 | Stop reason: HOSPADM

## 2021-01-04 RX ORDER — PROPOFOL 10 MG/ML
INJECTION, EMULSION INTRAVENOUS PRN
Status: DISCONTINUED | OUTPATIENT
Start: 2021-01-04 | End: 2021-01-04 | Stop reason: SDUPTHER

## 2021-01-04 RX ORDER — SODIUM CHLORIDE 0.9 % (FLUSH) 0.9 %
10 SYRINGE (ML) INJECTION EVERY 12 HOURS SCHEDULED
Status: DISCONTINUED | OUTPATIENT
Start: 2021-01-04 | End: 2021-01-04 | Stop reason: HOSPADM

## 2021-01-04 RX ORDER — BISMUTH TRIBROMOPH/PETROLATUM 5"X9"
BANDAGE TOPICAL PRN
Status: DISCONTINUED | OUTPATIENT
Start: 2021-01-04 | End: 2021-01-04 | Stop reason: ALTCHOICE

## 2021-01-04 RX ORDER — ROPIVACAINE HYDROCHLORIDE 2 MG/ML
INJECTION, SOLUTION EPIDURAL; INFILTRATION; PERINEURAL PRN
Status: DISCONTINUED | OUTPATIENT
Start: 2021-01-04 | End: 2021-01-04 | Stop reason: ALTCHOICE

## 2021-01-04 RX ORDER — APREPITANT 40 MG/1
40 CAPSULE ORAL ONCE
Status: COMPLETED | OUTPATIENT
Start: 2021-01-04 | End: 2021-01-04

## 2021-01-04 RX ORDER — PROPOFOL 10 MG/ML
INJECTION, EMULSION INTRAVENOUS CONTINUOUS PRN
Status: DISCONTINUED | OUTPATIENT
Start: 2021-01-04 | End: 2021-01-04 | Stop reason: SDUPTHER

## 2021-01-04 RX ORDER — ONDANSETRON 2 MG/ML
INJECTION INTRAMUSCULAR; INTRAVENOUS PRN
Status: DISCONTINUED | OUTPATIENT
Start: 2021-01-04 | End: 2021-01-04 | Stop reason: SDUPTHER

## 2021-01-04 RX ORDER — PROMETHAZINE HYDROCHLORIDE 25 MG/ML
6.25 INJECTION, SOLUTION INTRAMUSCULAR; INTRAVENOUS
Status: COMPLETED | OUTPATIENT
Start: 2021-01-04 | End: 2021-01-04

## 2021-01-04 RX ADMIN — SODIUM CHLORIDE, SODIUM LACTATE, POTASSIUM CHLORIDE, AND CALCIUM CHLORIDE: 600; 310; 30; 20 INJECTION, SOLUTION INTRAVENOUS at 09:11

## 2021-01-04 RX ADMIN — FENTANYL CITRATE 50 MCG: 50 INJECTION, SOLUTION INTRAMUSCULAR; INTRAVENOUS at 11:00

## 2021-01-04 RX ADMIN — PROMETHAZINE HYDROCHLORIDE 6.25 MG: 25 INJECTION INTRAMUSCULAR; INTRAVENOUS at 12:54

## 2021-01-04 RX ADMIN — SODIUM CHLORIDE, SODIUM LACTATE, POTASSIUM CHLORIDE, AND CALCIUM CHLORIDE: 600; 310; 30; 20 INJECTION, SOLUTION INTRAVENOUS at 10:42

## 2021-01-04 RX ADMIN — ONDANSETRON HYDROCHLORIDE 4 MG: 2 INJECTION, SOLUTION INTRAMUSCULAR; INTRAVENOUS at 10:55

## 2021-01-04 RX ADMIN — FENTANYL CITRATE 50 MCG: 50 INJECTION, SOLUTION INTRAMUSCULAR; INTRAVENOUS at 10:50

## 2021-01-04 RX ADMIN — FENTANYL CITRATE 50 MCG: 50 INJECTION, SOLUTION INTRAMUSCULAR; INTRAVENOUS at 11:08

## 2021-01-04 RX ADMIN — APREPITANT 40 MG: 40 CAPSULE ORAL at 09:23

## 2021-01-04 RX ADMIN — CEFAZOLIN SODIUM 1 G: 1 INJECTION, POWDER, FOR SOLUTION INTRAMUSCULAR; INTRAVENOUS at 10:52

## 2021-01-04 RX ADMIN — PROPOFOL 120 MCG/KG/MIN: 10 INJECTION, EMULSION INTRAVENOUS at 10:48

## 2021-01-04 RX ADMIN — FAMOTIDINE 20 MG: 10 INJECTION, SOLUTION INTRAVENOUS at 09:23

## 2021-01-04 RX ADMIN — PROPOFOL 200 MG: 10 INJECTION, EMULSION INTRAVENOUS at 10:48

## 2021-01-04 RX ADMIN — HYDROMORPHONE HYDROCHLORIDE 0.5 MG: 1 INJECTION, SOLUTION INTRAMUSCULAR; INTRAVENOUS; SUBCUTANEOUS at 11:46

## 2021-01-04 RX ADMIN — LIDOCAINE HYDROCHLORIDE 5 ML: 10 INJECTION, SOLUTION INFILTRATION; PERINEURAL at 10:48

## 2021-01-04 RX ADMIN — FENTANYL CITRATE 50 MCG: 50 INJECTION, SOLUTION INTRAMUSCULAR; INTRAVENOUS at 12:09

## 2021-01-04 RX ADMIN — HYDROMORPHONE HYDROCHLORIDE 0.5 MG: 1 INJECTION, SOLUTION INTRAMUSCULAR; INTRAVENOUS; SUBCUTANEOUS at 11:58

## 2021-01-04 ASSESSMENT — PAIN DESCRIPTION - PAIN TYPE: TYPE: SURGICAL PAIN

## 2021-01-04 ASSESSMENT — LIFESTYLE VARIABLES: SMOKING_STATUS: 0

## 2021-01-04 ASSESSMENT — PAIN SCALES - GENERAL
PAINLEVEL_OUTOF10: 8
PAINLEVEL_OUTOF10: 3

## 2021-01-04 ASSESSMENT — PAIN SCALES - WONG BAKER: WONGBAKER_NUMERICALRESPONSE: 0

## 2021-01-04 NOTE — ANESTHESIA PRE PROCEDURE
Department of Anesthesiology  Preprocedure Note       Name:  Ellen Landaverde   Age:  63 y.o.  :  1960                                          MRN:  [id number]         Date:  2021      Surgeon: Aubrey Peace):  Federica Valerio MD    Procedure: Procedure(s):  RIGHT KNEE PATELLOFEMORAL CHONDROPLASTY, PARTIAL MEDIAL MENISCECTOMY    Medications prior to admission:   Prior to Admission medications    Medication Sig Start Date End Date Taking? Authorizing Provider   HYDROcodone-acetaminophen [facility])  MG per tablet 1 tab po q 4 prn pain 21 Yes Federica Valerio MD   ondansetron [facility]) 4 MG tablet Take 1 tablet by mouth daily as needed for Nausea or Vomiting 21  Yes Federica Valerio MD   losartan (COZAAR) 50 MG tablet Take 50 mg by mouth daily   Yes Historical Provider, MD   hydroCHLOROthiazide (MICROZIDE) 12.5 MG capsule Take 12.5 mg by mouth daily   Yes Historical Provider, MD   DICLOFENAC SODIUM PO Take 15 mg by mouth 2 times daily   Yes Historical Provider, MD   aspirin 81 MG EC tablet Take 81 mg by mouth daily. Yes Historical Provider, MD   alendronate (FOSAMAX) 70 MG tablet Take 70 mg by mouth every 7 days     Historical Provider, MD       Current medications:    Current Facility-Administered Medications   Medication Dose Route Frequency Provider Last Rate Last Admin    ceFAZolin (ANCEF) 1 g in sterile water 10 mL IV syringe  1 g Intravenous Once Federica Valerio MD           Allergies:     Allergies   Allergen Reactions    Other Other (See Comments)     Steriods, had adverse reaction (Crazy)    Codeine Itching    Lisinopril      cough       Problem List:    Patient Active Problem List   Diagnosis Code    Family history of colon cancer Z80.0    History of colon polyps Z86.010       Past Medical History:        Diagnosis Date    Arthritis     Colon polyps     Hypertension     PONV (postoperative nausea and vomiting)        Past Surgical History:        Procedure Laterality Date  COLONOSCOPY  2004    Dr Alis Paris  09/10/2012    colon polyp (5 yr)    HYSTERECTOMY  8/23/13    [facility]/ BSO    TUBAL LIGATION         Social History:    Social History     Tobacco Use    Smoking status: Former Smoker     Packs/day: 0.25     Years: 30.00     Pack years: 7.50     Types: Cigarettes     Quit date: 12/29/2010     Years since quitting: 10.0    Smokeless tobacco: Never Used   Substance Use Topics    Alcohol use: No                                Counseling given: Not Answered      Vital Signs (Current):   Vitals:    01/04/21 0859   BP: (!) 148/76   Pulse: 86   Resp: 18   Temp: 97.6 °F (36.4 °C)   TempSrc: Temporal   SpO2: 97%   Weight: 140 lb (63.5 kg)   Height: 5' 3\" (1.6 m)                                              BP Readings from Last 3 Encounters:   01/04/21 (!) 148/76   07/12/17 120/80   08/25/15 110/60       NPO Status: Time of last liquid consumption: 0000                        Time of last solid consumption: 0000                        Date of last liquid consumption: 01/03/21                        Date of last solid food consumption: 01/03/21    BMI:   Wt Readings from Last 3 Encounters:   01/04/21 140 lb (63.5 kg)   12/29/20 140 lb (63.5 kg)   07/12/17 151 lb (68.5 kg)     Body mass index is 24.8 kg/m². CBC:   Lab Results   Component Value Date    WBC 9.3 12/29/2020    RBC 4.69 12/29/2020    HGB 14.6 12/29/2020    HCT 45.6 12/29/2020    MCV 97.2 12/29/2020    RDW 12.8 12/29/2020     12/29/2020       CMP:   Lab Results   Component Value Date     12/29/2020    K 4.1 12/29/2020     12/29/2020    CO2 28 12/29/2020    BUN 22 12/29/2020    CREATININE 0.6 12/29/2020    GFRAA >59 12/29/2020    LABGLOM >60 12/29/2020    GLUCOSE 105 12/29/2020    CALCIUM 9.5 12/29/2020       POC Tests: No results for input(s): POCGLU, POCNA, POCK, POCCL, POCBUN, POCHEMO, POCHCT in the last 72 hours.     Coags: No results found for: PROTIME, INR, APTT

## 2021-01-04 NOTE — ANESTHESIA POSTPROCEDURE EVALUATION
Department of Anesthesiology  Postprocedure Note    Patient: Lisette Crespo  MRN: [id number]  YOB: 1960  Date of evaluation: 1/4/2021  Time:  11:41 AM     Procedure Summary     Date: 01/04/21 Room / Location: [address]    Anesthesia Start: 9812 Anesthesia Stop: 8631    Procedure: RIGHT KNEE PATELLOFEMORAL CHONDROPLASTY, PARTIAL MEDIAL MENISCECTOMY (Right ) Diagnosis: (H48.703Y, M22.41)    Surgeons: Pb Heath MD Responsible Provider: SHINE Truong CRNA    Anesthesia Type: TIVA, general ASA Status: 2          Anesthesia Type: TIVA, general    Hue Phase I:      Hue Phase II:      Last vitals: Reviewed and per EMR flowsheets.        Anesthesia Post Evaluation    Patient location during evaluation: bedside  Patient participation: complete - patient participated  Level of consciousness: awake and alert  Pain score: 0  Airway patency: patent  Nausea & Vomiting: no nausea  Complications: no  Cardiovascular status: hemodynamically stable  Respiratory status: acceptable  Hydration status: euvolemic

## 2021-01-05 NOTE — OP NOTE
[facility] [address] [address], [address] [facility]                                OPERATIVE REPORT    PATIENT NAME: Greer Ma                          :        1960  MED REC NO:   [id number]                              ROOM:  ACCOUNT NO:   [de-identified]                           ADMIT DATE: 2021  PROVIDER:     Vicki Casillas MD    DATE OF PROCEDURE:  2021    PREOPERATIVE DIAGNOSES:  1. Right knee complex medial meniscus tear. 2.  Right knee patellofemoral chondromalacia. POSTOPERATIVE DIAGNOSES:  1. Right knee medial meniscus posterior horn tear. 2.  Right knee patellofemoral cartilage tear. PROCEDURES:  1. Right knee arthroscopic partial medial meniscectomy of the posterior  horn tear. 2.  Right knee arthroscopic patellofemoral chondroplasty for  chondromalacia patella and trochlea. SURGEON:  Vicki Casillas MD    ASSISTANT:  Lala Duncan. ANESTHESIA:  General endotracheal anesthesia. ESTIMATED BLOOD LOSS:  Minimal.    COMPLICATIONS:  None. CONDITION:  Stable. HISTORY:  This is a 58-year-old female who presented to the outpatient  clinic with complaints of anterior medial-sided knee pain with  mechanical symptoms. An MRI demonstrated chondromalacia of the patella  and the trochlea of the patellofemoral joint. MRI also demonstrated a  complex tear of the posterior horn of the medial meniscus. Based on  this, we elected to take her to the operating room for the  above-mentioned procedure. After risks and benefits had been discussed  with the patient, she agreed to proceed. OPERATIVE PROCEDURE:  The patient was interviewed in the preanesthesia  area where her right knee was marked with a marking pen.   She was then  taken to the operative suite where general endotracheal anesthesia was  performed by the Anesthesia team.  Madonna Johnson was then called confirming  the patient, the operative site as well as the planned procedure and the administration of antibiotics. The patient was prepped and draped in  standard sterile fashion using ChloraPrep. Once fully prepped and draped, the right lower extremity was  exsanguinated with an Esmarch, tourniquet was inflated to 200. A  standard anterolateral arthroscopic portal was established with an 11  blade scalpel and the scope trocar was introduced into the notch and  suprapatellar pouch. The patient had a mild amount of synovitis in the  suprapatellar pouch, quadriceps expansion was intact. The undersurface  of the patella demonstrated tearing of the cartilage, as did the  trochlea. The knee was then dropped into flexion where a spinal needle  was brought in just above the medial meniscus followed by an 11 blade  scalpel and then a probe. The medial compartment was inspected. The patient did have a tear of  the posterior horn of the meniscus. This created a flap tear where the  root component was intact but not attached to the body. This also  extended into the body as a peripheral tear. Initially, I just  carefully probed to see if she might be a candidate for root repair. I  felt like that the tear was too far medial for this to be successful and  therefore, I elected to perform partial medial meniscectomy. The inner  3 to 4 mm of the midbody of the meniscus were excised. I then removed  the posterior root tear. The horn itself was still scarred into the  capsule. Scope was then taken across the notch where ACL and PCL were found to be  intact. Knee was then placed in figure-of-four position where I could  see the lateral femoral condyle, tibial plateau and lateral meniscus  were all intact. Once this was complete, the knee was brought into  extension where a shaver and radiofrequency wand were used to perform a  chondroplasty on the trochlea as well as the patella resecting the loose  cartilage edges. Scope was then withdrawn from the knee.   The portal sites were closed with 3-0 nylon suture. The patient awoke from  anesthesia without difficulty and was transferred to PACU in stable  condition. All sponge, needle, and instrument counts were correct at  the end of the procedure.         Marques Power MD    D: 01/04/2021 15:05:07      T: 01/04/2021 15:08:40     [email or web address]  Job#: [id number]     Doc#: [id number]    CC:

## 2021-06-21 ENCOUNTER — NURSE TRIAGE (OUTPATIENT)
Dept: OTHER | Facility: CLINIC | Age: 61
End: 2021-06-21

## 2021-06-21 NOTE — TELEPHONE ENCOUNTER
Location of employment: [address], [place]    Location of injury (body part involved): Left shoulder    Time of injury: Unsure of exact date    Last 4 of SSN: [id number]    Triage indicates for caller to see someone today or tomorrow however this is an old injury which has turned into a more chronic type of condition    Care advice provided, caller verbalizes understanding; denies any other questions or concerns. Reason for Disposition   Injury interferes with work or school    Answer Assessment - Initial Assessment Questions  1. MECHANISM: \"How did the injury happen? \"      Transferring pt from stretcher to bed    2. ONSET: \"When did the injury happen? \" (Minutes or hours ago)       Sometime in April    3. APPEARANCE of INJURY: \"What does the injury look like? \"       Looks normal    4. SEVERITY: \"Can you move the shoulder normally? \"       Restricted ROM    5. SIZE: For cuts, bruises, or swelling, ask: \"How large is it? \" (e.g., inches or centimeters;  entire joint)       NA    6. PAIN: \"Is there pain? \" If so, ask: \"How bad is the pain? \"   (e.g., Scale 1-10; or mild, moderate, severe)      When reaching over the head it is severe and mild if not trying to use the arm    7. TETANUS: For any breaks in the skin, ask: \"When was the last tetanus booster? \"      NA    8. OTHER SYMPTOMS: \"Do you have any other symptoms? \" (e.g., loss of sensation)      No    9. PREGNANCY: \"Is there any chance you are pregnant? \" \"When was your last menstrual period? \"      NA    Protocols used: SHOULDER INJURY-ADULT-[place]

## 2021-06-29 ENCOUNTER — NURSE TRIAGE (OUTPATIENT)
Dept: OTHER | Facility: CLINIC | Age: 61
End: 2021-06-29

## 2021-06-29 NOTE — TELEPHONE ENCOUNTER
Reason for Disposition   Information only question and nurse able to answer    Answer Assessment - Initial Assessment Questions  1. REASON FOR CALL or QUESTION: \"What is your reason for calling today? \" or \"How can I best help you? \" or \"What question do you have that I can help answer? \"      Wanted to follow up from call on 6/21 and find an [facility] location for her left shoulder injury. Pain is same-no improvement and the location she was told to go stated they could not schedule her. Protocols used: INFORMATION ONLY CALL - NO TRIAGE-ADULT-[place]    Follow up to employee injury call from 6/21/2021.

## 2021-07-09 ENCOUNTER — HOSPITAL ENCOUNTER (OUTPATIENT)
Dept: GENERAL RADIOLOGY | Age: 61
Discharge: HOME | End: 2021-07-09
Payer: COMMERCIAL

## 2021-07-09 DIAGNOSIS — S46.812A STRAIN OF LEFT TRAPEZIUS MUSCLE, INITIAL ENCOUNTER: ICD-10-CM

## 2021-07-09 DIAGNOSIS — M79.622 AXILLARY PAIN, LEFT: ICD-10-CM

## 2021-07-09 PROCEDURE — 73060 X-RAY EXAM OF HUMERUS: CPT

## 2021-07-09 PROCEDURE — 73030 X-RAY EXAM OF SHOULDER: CPT

## 2021-08-05 ENCOUNTER — HOSPITAL ENCOUNTER (OUTPATIENT)
Dept: PHYSICAL THERAPY | Age: 61
Setting detail: THERAPIES SERIES
Discharge: HOME | End: 2021-08-05
Payer: COMMERCIAL

## 2021-08-05 PROCEDURE — 97110 THERAPEUTIC EXERCISES: CPT

## 2021-08-05 PROCEDURE — 97162 PT EVAL MOD COMPLEX 30 MIN: CPT

## 2021-08-05 ASSESSMENT — PAIN DESCRIPTION - DESCRIPTORS: DESCRIPTORS: DISCOMFORT

## 2021-08-05 ASSESSMENT — PAIN DESCRIPTION - FREQUENCY: FREQUENCY: CONTINUOUS

## 2021-08-05 ASSESSMENT — PAIN SCALES - GENERAL: PAINLEVEL_OUTOF10: 2

## 2021-08-05 ASSESSMENT — PAIN DESCRIPTION - LOCATION: LOCATION: SHOULDER

## 2021-08-05 ASSESSMENT — PAIN DESCRIPTION - ORIENTATION: ORIENTATION: MID

## 2021-08-05 NOTE — PROGRESS NOTES
Physical Therapy  Initial Assessment  Date: 2021  Patient Name: Alfonso Powell  MRN: [id number]  : 1960          Restrictions       Subjective   General  Chart Reviewed: Yes  Patient assessed for rehabilitation services?: Yes  Additional Pertinent Hx: 63year old female referred to PT with diagnosis left shoulder pain. Response To Previous Treatment: Not applicable  Referring Practitioner: Susi Paulino. 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 She heard and felt a pop in the left shoulder, felt immediate burning and had pain which seemed to initially be improving. She continued,however, to have popping and did not feel she was getting better nor worse. She is having difficulty reaching behind her back, can only reach overhead to about 90 degrees, \" can't spike an IV bag at work. \" She continues to work but with some help from her coworkers. 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 She has not had problems with her left shoulder prior to her injury. She is having some difficulty sleeping and getting comfortable, has difficulty performing most all her usual activities at home.   Pain Screening  Patient Currently in Pain: Yes  Pain Assessment  Pain Assessment: 0-10  Pain Level: 2 (pain increases with movements of left arm)  Pain Location: Shoulder  Pain Orientation: Mid  Pain Descriptors: Discomfort  Pain Frequency: Continuous (continuous but variable in intensity depending on arm position)  Vital Signs  Patient Currently in Pain: Yes    Vision/Hearing  Vision  Vision: Within Functional Limits  Hearing  Hearing: Within functional limits    Orientation  Orientation  Overall Orientation Status: Within Normal Limits    Social/Functional History  Social/Functional History  ADL Assistance: Independent (reaching behind her back is the most painful motion, as well as forward reaching)  Homemaking Assistance: Independent  Homemaking Responsibilities:  (Getting dressed, housework is difficult, carrying groceries is difficult)  Ambulation Assistance: Independent  Transfer Assistance: Independent  Active : Yes  Type of occupation: [employer], surgery (circulator, in motion all day)    Objective     Observation/Palpation  Posture: Fair  Palpation: Increased tenderness to palpation of left bicipital groove, left common tendon insertion, biceps belly. Some crepitus of left shoulder with palpation during arm movement. Observation: Patient sits with slight lean onto left elbow, comes to standing avoiding pushing with left arm. Stands with elevated left scapula, rounding of shoulders bilaterally, mild increased thoracic kyphosis. Joint Mobility  ROM LUE: left shoulder flexion 125 degrees, 90 degrees abduction, 20 degrees ER and 45 degrees IR    Strength LUE  L Shoulder Flexion: 4+/5  L Shoulder ABduction: 4+/5  L Shoulder Internal Rotation: 5/5  L Shoulder External Rotation: 5/5  L Elbow Flexion: 5/5  L Elbow Extension: 4+/5  L Forearm Pron: 5/5  L Forearm Sup: 4+/5;5/5  L Wrist Flexion: 5/5  L Wrist Extension: 5/5     Additional Measures  Special Tests: + downward turned \"can\"  test, increased pain with shoulder IR and reaching behind back, some pain with left arm golfer's stretch, increased pain with supine abduction (can only tolerate 90 degrees of flexion.   Sensation  Overall Sensation Status: WNL     Transfers  Sit to Stand: Independent  Stand to sit: Independent                                    Assessment   Conditions Requiring Skilled Therapeutic Intervention  Body structures, Functions, Activity limitations: Increased pain;Decreased posture;Decreased high-level IADLs;Decreased strength;Decreased ROM; Decreased ADL status  Assessment: Problem list: 1) pain in left shoulder, 2) decreased ROM left shoulder, 3) decreased functional use of left arm for ADL's, 4) some decreased ability to perform normal work duties, 5) need for instruction in HEP with use of home pulleys. Prognosis: Good  Decision Making: Medium Complexity  REQUIRES PT FOLLOW UP: Yes  Activity Tolerance  Activity Tolerance: Patient Tolerated treatment well;Patient limited by pain (some arm movements limited by pain)         Plan   Plan  Times per week: 2 x per week  Plan weeks: 6 weeks (13 visits total)  Current Treatment Recommendations: Strengthening, ROM, Home Exercise Program, Patient/Caregiver Education & Training, Equipment Evaluation, Education, & procurement, Modalities, Pain Management, Manual Therapy - Joint Manipulation, Manual Therapy - Soft Tissue Mobilization    Goals  Short term goals  Short term goal 1: Patient to be independent with HEP  Short term goal 2: Patient to be independent with use of home overhead pulleys. Short term goal 3: Patient to report less pain with reaching behind back and overhead. Short term goal 4: Patient to report being able to \"spike\" overhead IV bag at work. Long term goals  Time Frame for Long term goals : 4-6 weeks  Long term goal 1: Patient to have >= 165 degrees left shoulder flexion, >= 145 degrees shoulder abduction, >= 55 degrees ER and 60 degrees IR. Long term goal 2: Patient to have >= 5-/5 left shoulder flexion and abduction. Long term goal 3: Patient to score <= 20% impairment on the general use portion of the QuickDASH.   Long term goal 4: Patient to score <= 19% impairment on the QuickDA work module.        Therapy Time   Individual Concurrent Group Co-treatment   Time In 7315         Time Out 1700         Minutes 63         Timed Code Treatment Minutes: 63 Minutes  Electronically signed by Lesly Contreras PT on 8/5/2021 at 5:35 PM

## 2021-08-10 ENCOUNTER — APPOINTMENT (OUTPATIENT)
Dept: PHYSICAL THERAPY | Age: 61
End: 2021-08-10
Payer: COMMERCIAL

## 2021-08-11 ENCOUNTER — HOSPITAL ENCOUNTER (OUTPATIENT)
Dept: PHYSICAL THERAPY | Age: 61
Setting detail: THERAPIES SERIES
Discharge: HOME | End: 2021-08-11
Payer: COMMERCIAL

## 2021-08-11 PROCEDURE — 97110 THERAPEUTIC EXERCISES: CPT

## 2021-08-11 ASSESSMENT — PAIN DESCRIPTION - FREQUENCY: FREQUENCY: CONTINUOUS

## 2021-08-11 ASSESSMENT — PAIN DESCRIPTION - ORIENTATION: ORIENTATION: LEFT;MID

## 2021-08-11 ASSESSMENT — PAIN SCALES - GENERAL: PAINLEVEL_OUTOF10: 4

## 2021-08-11 ASSESSMENT — PAIN DESCRIPTION - DESCRIPTORS: DESCRIPTORS: DISCOMFORT

## 2021-08-11 ASSESSMENT — PAIN DESCRIPTION - LOCATION: LOCATION: SHOULDER

## 2021-08-11 NOTE — PROGRESS NOTES
Physical Therapy  Daily Treatment Note  Date: 2021  Patient Name: Cee Bermudez  MRN: [id number]     :   1960    Subjective:   General  Chart Reviewed: Yes  Additional Pertinent Hx: 63year old female referred to PT with diagnosis left shoulder pain. Response To Previous Treatment: Not applicable  Referring Practitioner: Melanie Grider. Jessa Bender, APRN-CNP  PT Visit Information  PT Insurance Information: worker's comp Bhavya Kimffe)  Total # of Visits Approved: 13 (12 + initial evaluation)  Total # of Visits to Date: 2  Subjective  Subjective: Patient reports that she is able to raise arm a little better since starting the pulleys.   Pain Screening  Patient Currently in Pain: Yes  Pain Assessment  Pain Assessment: 0-10  Pain Level: 4  Pain Location: Shoulder  Pain Orientation: Left;Mid  Pain Descriptors: Discomfort  Pain Frequency: Continuous  Vital Signs  Patient Currently in Pain: Yes       Treatment Activities:                                    Exercises  Exercise 1: ++left arm, follow MOON exercises in her chart++              overhead pulleys 5 minutes  Exercise 2: wall walk left arm--10 reps, 5\" hold at top  Exercise 3: pendulums left arm (circles, side to side)--3# 10/10/10/10  Exercise 4: left arm golfer's stretch--5 reps, 10\" hold  Exercise 5: corner stretch--2 reps 30\"  Exercise 6: supine overhead cane raise--10 reps, 5\" end hold  Exercise 7: supine cane abduction and ER left arm--10 reps, 10 reps  Exercise 8: shoulder protraction supine (with weights if tolerated)--2#, 20 reps  Exercise 9: manual stretch/mobilization left shoulder (ER, flex, ABD)--~ 5 minutes  Exercise 10: left sidelying sleeper stretch (from MOON protocol)--5 reps, 10 \" hold  Exercise 11: prone left shoulder horizontal abduction (with assist as needed)  Exercise 12: Wibaux's exercise at 45 degrees left arm  Exercise 13: elbow digs into mat--10 reps  Exercise 14: right sidelying left ER--10 reps, 2# weight  Exercise 15: shoulder shrugs and backward rolls--3# bilaterally  Exercise 16: scapular retraction with band--10 reps with red band  Exercise 17: forward bent row with weight  Exercise 18: IR/ER, left shoulder ext. with t-band  Exercise 19: standing overhead arm raises (scaption)  Exercise 20: chair walk aways (left arm pushing down on chair, walk to and back holding pressure)              ++++issued HEP                               Assessment:   Conditions Requiring Skilled Therapeutic Intervention  Body structures, Functions, Activity limitations: Increased pain;Decreased posture;Decreased high-level IADLs;Decreased strength;Decreased ROM; Decreased ADL status  Assessment: Patient gave good effort with her first exercise session, complained of appropriate discomfort but gave good effort and reported her shoulder pain had lessened to 3/10 post treatment. She has seen some improvement since starting to use home pulleys. She was issued a copy of her HEP and selected exercises were reviewed. Prognosis: Good  Decision Making: Medium Complexity  REQUIRES PT FOLLOW UP: Yes    Goals:  Short term goals  Short term goal 1: Patient to be independent with HEP  Short term goal 2: Patient to be independent with use of home overhead pulleys. Short term goal 3: Patient to report less pain with reaching behind back and overhead. Short term goal 4: Patient to report being able to \"spike\" overhead IV bag at work. Long term goals  Time Frame for Long term goals : 4-6 weeks  Long term goal 1: Patient to have >= 165 degrees left shoulder flexion, >= 145 degrees shoulder abduction, >= 55 degrees ER and 60 degrees IR. Long term goal 2: Patient to have >= 5-/5 left shoulder flexion and abduction. Long term goal 3: Patient to score <= 20% impairment on the general use portion of the QuickDASH. Long term goal 4: Patient to score <= 19% impairment on the QuickDASH work module.   Plan:    Plan  Times per week: 2 x per week  Plan weeks: 6 weeks (13 visits total)  Current Treatment Recommendations: Strengthening, ROM, Home Exercise Program, Patient/Caregiver Education & Training, Equipment Evaluation, Education, & procurement, Modalities, Pain Management, Manual Therapy - Joint Manipulation, Manual Therapy - Soft Tissue Mobilization  Timed Code Treatment Minutes: 45 Minutes     Therapy Time   Individual Concurrent Group Co-treatment   Time In 8926         Time Out 1340         Minutes 45         Timed Code Treatment Minutes: 45 Minutes  Electronically signed by Rosaura Kumar PT on 8/11/2021 at 1:52 PM

## 2021-08-18 ENCOUNTER — HOSPITAL ENCOUNTER (OUTPATIENT)
Dept: PHYSICAL THERAPY | Age: 61
Setting detail: THERAPIES SERIES
End: 2021-08-18
Payer: COMMERCIAL

## 2021-08-20 ENCOUNTER — HOSPITAL ENCOUNTER (OUTPATIENT)
Dept: PHYSICAL THERAPY | Age: 61
Setting detail: THERAPIES SERIES
End: 2021-08-20
Payer: COMMERCIAL

## 2021-08-24 ENCOUNTER — HOSPITAL ENCOUNTER (OUTPATIENT)
Dept: PHYSICAL THERAPY | Age: 61
Setting detail: THERAPIES SERIES
Discharge: HOME | End: 2021-08-24
Payer: COMMERCIAL

## 2021-08-24 PROCEDURE — 97110 THERAPEUTIC EXERCISES: CPT

## 2021-08-24 NOTE — PROGRESS NOTES
Physical Therapy  Daily Treatment Note  Date: 2021  Patient Name: Erickson Allen  MRN: [id number]     :   1960    Subjective:   General  Chart Reviewed: Yes  Additional Pertinent Hx: 63year old female referred to PT with diagnosis left shoulder pain. Response To Previous Treatment: Not applicable  Referring Practitioner: Ap Mayfield. Chito Brice, APRN-CNP  PT Visit Information  PT Insurance Information: worker's comp Luz Anderson)  Total # of Visits Approved: 13 (12 + initial evaluation)  Total # of Visits to Date: 3  Plan of Care/Certification Expiration Date: 21  Progress Note Due Date: 21  Subjective  Subjective: Had to work today. Sometimes I cant get away from work to get here. I am sorry.   Pain Screening  Patient Currently in Pain: No       Treatment Activities:          Exercises  Exercise 1: ++left arm, follow MOON exercises in her chart++              overhead pulleys 5 minutes  Exercise 2: wall walk left arm--10 reps, 5\" hold at top  Exercise 3: pendulums left arm (circles, side to side)--3# 10/10/10  Exercise 4: left arm golfer's stretch--5 reps, 10\" hold  Exercise 5: corner stretch--2 reps 30\"  Exercise 6: supine overhead cane raise--10 reps, 5\" end hold  Exercise 7: supine cane abduction and ER left arm--10 reps, 10 reps  Exercise 8: shoulder protraction supine (with weights if tolerated)--2#, 20 reps  Exercise 9: manual stretch/mobilization left shoulder (ER, flex, ABD)--~ 5 minutes  Exercise 10: left sidelying sleeper stretch (from MOON protocol)--5 reps, 10 \" hold  Exercise 11: prone left shoulder horizontal abduction (with assist as needed)  Exercise 12: Richmond's exercise at 45 degrees left arm 3 x 10  Exercise 13: elbow digs into mat--10 reps  Exercise 14: right sidelying left ER--10 reps, 2# weight  Exercise 15: shoulder shrugs and backward rolls--3# bilaterally x 10 each  Exercise 16: scapular retraction with band--10 reps with red band  Exercise 17: forward bent row with weight  Exercise 18: IR/ER, left shoulder ext. with t-band  Exercise 19: standing overhead arm raises (scaption)  Exercise 20: chair walk aways (left arm pushing down on chair, walk to and back holding pressure)              ++++issued HEP              Assessment:   Conditions Requiring Skilled Therapeutic Intervention  Body structures, Functions, Activity limitations: Increased pain;Decreased posture;Decreased high-level IADLs;Decreased strength;Decreased ROM; Decreased ADL status  Assessment: Patient gave good effort with session. Facial grimmace and vocal grunts during sessions activities. Advised her to work short of intense pain with ex and stretching. She reported no pain pre session and 2-3/10 post.  Prognosis: Good  Decision Making: Medium Complexity  REQUIRES PT FOLLOW UP: Yes    Goals:  Short term goals  Short term goal 1: Patient to be independent with HEP  Short term goal 2: Patient to be independent with use of home overhead pulleys. Short term goal 3: Patient to report less pain with reaching behind back and overhead. Short term goal 4: Patient to report being able to \"spike\" overhead IV bag at work. Long term goals  Time Frame for Long term goals : 4-6 weeks  Long term goal 1: Patient to have >= 165 degrees left shoulder flexion, >= 145 degrees shoulder abduction, >= 55 degrees ER and 60 degrees IR. Long term goal 2: Patient to have >= 5-/5 left shoulder flexion and abduction. Long term goal 3: Patient to score <= 20% impairment on the general use portion of the QuickDASH. Long term goal 4: Patient to score <= 19% impairment on the QuickDASH work module.   Plan:    Plan  Times per week: 2 x per week  Plan weeks: 6 weeks (13 visits total)  Current Treatment Recommendations: Strengthening, ROM, Home Exercise Program, Patient/Caregiver Education & Training, Equipment Evaluation, Education, & procurement, Modalities, Pain Management, Manual Therapy - Joint Manipulation, Manual Therapy - Soft Tissue Mobilization  Timed Code Treatment Minutes: 50 Minutes     Therapy Time   Individual Concurrent Group Co-treatment   Time In 5860         Time Out 1658         Minutes 50         Timed Code Treatment Minutes: 50 Minutes  Electronically signed by Jailene Hickey PTA on 8/24/2021 at 5:46 PM

## 2021-08-25 ENCOUNTER — APPOINTMENT (OUTPATIENT)
Dept: PHYSICAL THERAPY | Age: 61
End: 2021-08-25
Payer: COMMERCIAL

## 2021-09-01 ENCOUNTER — HOSPITAL ENCOUNTER (OUTPATIENT)
Dept: PHYSICAL THERAPY | Age: 61
Setting detail: THERAPIES SERIES
Discharge: HOME | End: 2021-09-01
Payer: COMMERCIAL

## 2021-09-01 PROCEDURE — 97110 THERAPEUTIC EXERCISES: CPT

## 2021-09-01 NOTE — PROGRESS NOTES
holding pressure)--not today              ++++8/11: issued HEP     Assessment:   Conditions Requiring Skilled Therapeutic Intervention  Body structures, Functions, Activity limitations: Increased pain;Decreased posture;Decreased high-level IADLs;Decreased strength;Decreased ROM; Decreased ADL status  Assessment: Patient continues with good tolerance for exercise routine, sleeper stretch and abduction were the most uncomfortable exercises to perform today and fair tolerance with corner stretch. Added fwd bent row and attempted prone horizontal abduction but unable to tolerate. Will continue to monitor her progress and advance accordingly. REQUIRES PT FOLLOW UP: Yes        Goals:  Short term goals  Short term goal 1: Patient to be independent with HEP  Short term goal 2: Patient to be independent with use of home overhead pulleys. Short term goal 3: Patient to report less pain with reaching behind back and overhead. Short term goal 4: Patient to report being able to \"spike\" overhead IV bag at work. Long term goals  Time Frame for Long term goals : 4-6 weeks  Long term goal 1: Patient to have >= 165 degrees left shoulder flexion, >= 145 degrees shoulder abduction, >= 55 degrees ER and 60 degrees IR. Long term goal 2: Patient to have >= 5-/5 left shoulder flexion and abduction. Long term goal 3: Patient to score <= 20% impairment on the general use portion of the QuickDASH. Long term goal 4: Patient to score <= 19% impairment on the QuickDASH work module.     Plan:    Times per week: 2 x per week  Plan weeks: 6 weeks (13 visits total)  Current Treatment Recommendations: Strengthening, ROM, Home Exercise Program, Patient/Caregiver Education & Training, Equipment Evaluation, Education, & procurement, Modalities, Pain Management, Manual Therapy - Joint Manipulation, Manual Therapy - Soft Tissue Mobilization        Therapy Time   Individual Concurrent Group Co-treatment   Time In 5150         Time Out [phone]

## 2021-09-02 ENCOUNTER — HOSPITAL ENCOUNTER (OUTPATIENT)
Dept: PHYSICAL THERAPY | Age: 61
Setting detail: THERAPIES SERIES
Discharge: HOME | End: 2021-09-02
Payer: COMMERCIAL

## 2021-09-02 PROCEDURE — 97110 THERAPEUTIC EXERCISES: CPT

## 2021-09-02 ASSESSMENT — PAIN SCALES - GENERAL: PAINLEVEL_OUTOF10: 2

## 2021-09-02 ASSESSMENT — PAIN DESCRIPTION - ORIENTATION: ORIENTATION: LEFT

## 2021-09-02 ASSESSMENT — PAIN DESCRIPTION - LOCATION: LOCATION: SHOULDER

## 2021-09-02 ASSESSMENT — PAIN DESCRIPTION - DESCRIPTORS: DESCRIPTORS: SORE

## 2021-09-02 ASSESSMENT — PAIN DESCRIPTION - PAIN TYPE: TYPE: CHRONIC PAIN

## 2021-09-02 NOTE — PROGRESS NOTES
Physical Therapy  Daily Treatment Note  Date: 2021  Patient Name: Alfonso Powell  MRN: [id number]     :   1960    Subjective:   General  Referring Practitioner: Susi Paulino.  Camden Garcia, APRN-CNP  PT Insurance Information: worker's comp [employer])  Total # of Visits Approved: 13  Total # of Visits to Date: 5  Plan of Care/Certification Expiration Date: 21  Progress Note Due Date: 21  Subjective: more stiff and sore than anything but i had a rough day at work also  Patient Currently in Pain: Yes  Pain Level: 2  Pain Type: Chronic pain  Pain Location: Shoulder  Pain Orientation: Left  Pain Descriptors: Sore       Treatment Activities:      Exercises  Exercise 1: ++left arm, follow MOON exercises in her chart++              overhead pulleys 5 minutes  Exercise 2: wall walk left arm--10 reps, 5\" hold at top  Exercise 3: pendulums left arm (circles, side to side)--3# 10/10/10  Exercise 4: left arm golfer's stretch--5 reps, 10\" hold  Exercise 5: corner stretch--2 reps 30\"  Exercise 6: supine overhead cane raise--10 reps, 5\" end hold  Exercise 7: supine cane abduction and ER left arm--10 reps, 10 reps  Exercise 8: shoulder protraction supine (with weights if tolerated)--2#, 20 reps  Exercise 9: manual stretch/mobilization left shoulder (ER, flex, ABD)--~ 5 minutes  Exercise 10: left sidelying sleeper stretch (from MOON protocol)--5 reps, 10 \" hold  Exercise 11: prone left shoulder horizontal abduction (with assist as needed)--not today  Exercise 12: Rougon's exercise at 45 degrees left arm 3 x 10  Exercise 13: elbow digs into mat--10 reps  Exercise 14: right sidelying left ER--10 reps, 2# weight  Exercise 15: shoulder shrugs and backward rolls--3# bilaterally x 10 each  Exercise 16: scapular retraction with band--10 reps with red band  Exercise 17: forward bent row with weight  2#  x 10  Exercise 18: IR/ER, left shoulder ext. with t-band--not today  Exercise 19: standing overhead arm raises (scaption)--not today  Exercise 20: chair walk aways (left arm pushing down on chair, walk to and back holding pressure)--not today              ++++8/11: issued HEP        Assessment:   Conditions Requiring Skilled Therapeutic Intervention  Body structures, Functions, Activity limitations: Increased pain;Decreased posture;Decreased high-level IADLs;Decreased strength;Decreased ROM; Decreased ADL status  Assessment: Patient had a little better tolerance with corner stretch bue sleeper stretch and abduction still rather uncomfortable and abduction only to 90 deg, will add additional exercises per her tolerance. REQUIRES PT FOLLOW UP: Yes             Goals:  Short term goals  Short term goal 1: Patient to be independent with HEP  Short term goal 2: Patient to be independent with use of home overhead pulleys. Short term goal 3: Patient to report less pain with reaching behind back and overhead. Short term goal 4: Patient to report being able to \"spike\" overhead IV bag at work. Long term goals  Time Frame for Long term goals : 4-6 weeks  Long term goal 1: Patient to have >= 165 degrees left shoulder flexion, >= 145 degrees shoulder abduction, >= 55 degrees ER and 60 degrees IR. Long term goal 2: Patient to have >= 5-/5 left shoulder flexion and abduction. Long term goal 3: Patient to score <= 20% impairment on the general use portion of the QuickDASH. Long term goal 4: Patient to score <= 19% impairment on the QuickDASH work module.     Plan:    Times per week: 2 x per week  Plan weeks: 6 weeks (13 visits total)  Current Treatment Recommendations: Strengthening, ROM, Home Exercise Program, Patient/Caregiver Education & Training, Equipment Evaluation, Education, & procurement, Modalities, Pain Management, Manual Therapy - Joint Manipulation, Manual Therapy - Soft Tissue Mobilization        Therapy Time   Individual Concurrent Group Co-treatment   Time In [address] [place] [address] 30         Time Out 306 [address]         Minutes [id number] [facility], PTA    Electronically signed by Agapito Hendrickson PTA on 9/2/2021 at 4:54 PM

## 2021-09-07 ENCOUNTER — APPOINTMENT (OUTPATIENT)
Dept: PHYSICAL THERAPY | Age: 61
End: 2021-09-07
Payer: COMMERCIAL

## 2021-09-10 ENCOUNTER — APPOINTMENT (OUTPATIENT)
Dept: PHYSICAL THERAPY | Age: 61
End: 2021-09-10
Payer: COMMERCIAL

## 2021-09-24 ENCOUNTER — TRANSCRIBE ORDERS (OUTPATIENT)
Dept: ADMINISTRATIVE | Facility: HOSPITAL | Age: 61
End: 2021-09-24

## 2021-09-24 ENCOUNTER — HOSPITAL ENCOUNTER (OUTPATIENT)
Dept: ULTRASOUND IMAGING | Facility: HOSPITAL | Age: 61
Discharge: HOME | End: 2021-09-24
Admitting: PODIATRIST

## 2021-09-24 DIAGNOSIS — I82.4Z3 ACUTE EMBOLISM AND THROMBOSIS OF DEEP VEIN OF BOTH DISTAL LOWER EXTREMITIES (HCC): ICD-10-CM

## 2021-09-24 DIAGNOSIS — I82.4Z3 ACUTE EMBOLISM AND THROMBOSIS OF DEEP VEIN OF BOTH DISTAL LOWER EXTREMITIES (HCC): Primary | ICD-10-CM

## 2021-09-24 DIAGNOSIS — M79.672 PAIN IN LEFT FOOT: ICD-10-CM

## 2021-09-24 PROCEDURE — 93971 EXTREMITY STUDY: CPT

## 2021-09-29 ENCOUNTER — HOSPITAL ENCOUNTER (OUTPATIENT)
Dept: PHYSICAL THERAPY | Age: 61
Setting detail: THERAPIES SERIES
End: 2021-09-29
Payer: COMMERCIAL

## 2021-11-11 NOTE — PROGRESS NOTES
1: Patient to have >= 165 degrees left shoulder flexion, >= 145 degrees shoulder abduction, >= 55 degrees ER and 60 degrees IR. Long term goal 2: Patient to have >= 5-/5 left shoulder flexion and abduction. Long term goal 3: Patient to score <= 20% impairment on the general use portion of the QuickDASH. Long term goal 4: Patient to score <= 19% impairment on the QuickDASH work module.          Electronically signed by Jaida Inman PT on 11/11/2021 at 9:45 AM

## 2022-02-15 ENCOUNTER — OFFICE VISIT (OUTPATIENT)
Dept: OBGYN CLINIC | Age: 62
End: 2022-02-15
Payer: OTHER GOVERNMENT

## 2022-02-15 VITALS
SYSTOLIC BLOOD PRESSURE: 139 MMHG | DIASTOLIC BLOOD PRESSURE: 84 MMHG | BODY MASS INDEX: 25.87 KG/M2 | HEIGHT: 63 IN | HEART RATE: 87 BPM | WEIGHT: 146 LBS

## 2022-02-15 DIAGNOSIS — N36.41 HYPERMOBILITY OF URETHRA: ICD-10-CM

## 2022-02-15 DIAGNOSIS — N81.10 PELVIC ORGAN PROLAPSE QUANTIFICATION STAGE 3 CYSTOCELE: Primary | ICD-10-CM

## 2022-02-15 DIAGNOSIS — Z76.89 ENCOUNTER TO ESTABLISH CARE: ICD-10-CM

## 2022-02-15 DIAGNOSIS — Z46.89 ENCOUNTER FOR FITTING AND ADJUSTMENT OF PESSARY: ICD-10-CM

## 2022-02-15 DIAGNOSIS — N81.10 PROLAPSE OF VAGINAL WALLS: ICD-10-CM

## 2022-02-15 DIAGNOSIS — N39.46 MIXED STRESS AND URGE URINARY INCONTINENCE: ICD-10-CM

## 2022-02-15 PROCEDURE — 57160 INSERT PESSARY/OTHER DEVICE: CPT | Performed by: ADVANCED PRACTICE MIDWIFE

## 2022-02-15 PROCEDURE — 99203 OFFICE O/P NEW LOW 30 MIN: CPT | Performed by: ADVANCED PRACTICE MIDWIFE

## 2022-02-15 RX ORDER — ERGOCALCIFEROL 1.25 MG/1
CAPSULE ORAL
COMMUNITY
Start: 2021-12-08

## 2022-02-15 NOTE — PROGRESS NOTES
[facility]  CNM Office Note    Kelsy Obando is a 57 y.o. female who presents today for her medical conditions/ complaints as noted below. Chief Complaint   Patient presents with    New Patient     patient does not remember seeing BJ before.  Establish Care    Other     bladder prolapse? ? Patient had a hysterectomy in  by Dr. Linnea Car. The bladder prolapse is extreme. Greatly affecting her quality of life. Horribly embarrassed, causes incontinence, pain, hygiene or keeping clean, can't keep it up anymore. Feels it easing out. It's horrible, afraid she is going to rupture her bladder. No pain in the vagina, just pain from that. hurts to clean, sneeze, cough. hurts into her stomach. HPI   Briana Javed presents today for what she believes is bladder prolapse. She states she had a hysterectomy in  and she has been leaking urine progressively worse since. She states she has had to push what she believes is her bladder back in daily for a while now and she feels pressure and pain vaginally. The pain is worse when she laughs, coughs, or sneezes. She is concerned about her hygiene due to this, and states this has negatively altered her life. She wears a pad day and night. Her  passed away and she is not sexually active. Patient Active Problem List   Diagnosis    Family history of colon cancer    History of colon polyps       No LMP recorded (lmp unknown). Patient has had a hysterectomy.       Past Medical History:   Diagnosis Date    Arthritis     Colon polyps     Hypertension     PONV (postoperative nausea and vomiting)      Past Surgical History:   Procedure Laterality Date    COLONOSCOPY      Dr Vandana Wallace COLONOSCOPY  09/10/2012    colon polyp (5 yr)    [facility]    KNEE ARTHROSCOPY Right 2021    RIGHT KNEE PATELLOFEMORAL CHONDROPLASTY, PARTIAL MEDIAL MENISCECTOMY performed by Rance Riedel, MD at [address] [place] [address] History   Problem Relation Age of Onset    Heart Disease Mother     Diabetes Mother     Colon Cancer Father     Liver Cancer Father     Lung Cancer Father     Colon Polyps Sister     Esophageal Cancer Neg Hx     Rectal Cancer Neg Hx     Stomach Cancer Neg Hx      Social History     Tobacco Use    Smoking status: Former Smoker     Packs/day: 0.25     Years: 30.00     Pack years: 7.50     Types: Cigarettes     Quit date: 2010     Years since quittin.1    Smokeless tobacco: Never Used   Substance Use Topics    Alcohol use: No       Current Outpatient Medications   Medication Sig Dispense Refill    vitamin D (ERGOCALCIFEROL) 1.25 MG (37039 UT) CAPS capsule TAKE 1 CAPSULE BY MOUTH 1 TIME A WEEK      losartan (COZAAR) 50 MG tablet Take 50 mg by mouth daily      hydroCHLOROthiazide (MICROZIDE) 12.5 MG capsule Take 12.5 mg by mouth daily      DICLOFENAC SODIUM PO Take 15 mg by mouth 2 times daily      aspirin 81 MG EC tablet Take 81 mg by mouth daily.  alendronate (FOSAMAX) 70 MG tablet Take 70 mg by mouth every 7 days Will (Patient not taking: Reported on 2/15/2022)       No current facility-administered medications for this visit. Allergies   Allergen Reactions    Other Other (See Comments)     Steriods, had adverse reaction (Crazy)    Codeine Itching    Lisinopril      cough     Vitals:    02/15/22 1314   BP: 139/84   Site: Left Upper Arm   Position: Sitting   Cuff Size: Medium Adult   Pulse: 87   Weight: 146 lb (66.2 kg)   Height: 5' 3\" (1.6 m)     Body mass index is 25.86 kg/m². Review of Systems   Constitutional: Negative. HENT: Negative. Eyes: Negative. Respiratory: Negative. Cardiovascular: Negative. Gastrointestinal: Negative. Endocrine: Negative. Genitourinary: Positive for pelvic pain. Incontinence, vaginal prolapse   Musculoskeletal: Negative. Skin: Negative. Allergic/Immunologic: Negative. Neurological: Negative.     Hematological: Negative. Psychiatric/Behavioral: Negative. All other systems reviewed and are negative. Physical Exam  Vitals and nursing note reviewed. Exam conducted with a chaperone present. Constitutional:       General: She is not in acute distress. HENT:      Head: Normocephalic. Eyes:      Pupils: Pupils are equal, round, and reactive to light. Pulmonary:      Effort: Pulmonary effort is normal. No respiratory distress. Abdominal:      Palpations: Abdomen is soft. Genitourinary:     Exam position: Lithotomy position. Vagina: Prolapsed vaginal walls present. Uterus: Absent. Comments: Hypermobility of urethra, vaginal prolapse, bilateral sidewall prolapse, Grade 3 cystocele, Gr1 rectocele    Uterus and cervix surgically absent    Skin:     General: Skin is warm and dry. Neurological:      Mental Status: She is alert and oriented to person, place, and time. Psychiatric:         Mood and Affect: Mood normal.         Behavior: Behavior normal.          Diagnosis Orders   1. Encounter to establish care     2. Hypermobility of urethra     3. Prolapse of vaginal walls     4. Pelvic organ prolapse quantification stage 3 cystocele     5. Mixed stress and urge urinary incontinence     6. Encounter for fitting and adjustment of pessary         MEDICATIONS:  No orders of the defined types were placed in this encounter. ORDERS:  No orders of the defined types were placed in this encounter. PLAN:  1. Fitted for Donut Pessary size # 2  2. To return to the office on Thursday for a follow up on pessary. If this pessary works well for her, we will order her one at that time.

## 2022-02-16 ASSESSMENT — ENCOUNTER SYMPTOMS
RESPIRATORY NEGATIVE: 1
GASTROINTESTINAL NEGATIVE: 1
EYES NEGATIVE: 1
ALLERGIC/IMMUNOLOGIC NEGATIVE: 1

## 2022-02-16 NOTE — PATIENT INSTRUCTIONS
Patient Education        Pessary: Care Instructions  Overview     A pessary is a device that fits into your vagina and supports the pelvic organs. It may be used if a pelvic organ sags or moves out of its normal position (prolapse). For some women, wearing a pessary means that they may not need to have surgery to fix a prolapse. A pessary also may help a woman who has trouble controlling her urine (incontinence). Or it may be used during a pregnancy to hold the uterus in place. There are many sizes and types of pessaries. Which type you use depends on the problem you have. Your doctor will make sure the pessary is just right for you. You may need to try different kinds to find the best fit. The pessary should hold the pelvic organ in place without causing pain or pressure. You may be able to have sex with your pessary in place. It depends on the type of pessary and your comfort level. Follow-up care is a key part of your treatment and safety. Be sure to make and go to all appointments, and call your doctor if you are having problems. It's also a good idea to know your test results and keep a list of the medicines you take. How can you care for yourself at home? · If you get a vaginal discharge while you have a pessary, talk to your doctor about ways to reduce the discharge and smell. A pessary, in some cases, rubs the vagina and may cause irritation and discharge. If your vagina feels sore, talk to your doctor about a cream or gel to protect the vagina. · Follow your doctor's advice on how long you can wear your pessary before it needs to be cleaned. You may be able to remove and clean it yourself. Or your doctor may want to do this during an office visit. · If you clean your pessary, wash it with mild soap and water. Follow your doctor's advice on inserting the pessary. · Do not douche or use a vaginal wash unless your doctor tells you to do so. · Do not smoke.  Smoking can cause a cough, which makes a prolapse worse. If you need help quitting, talk to your doctor about stop-smoking programs and medicines. These can increase your chances of quitting for good. To help support your pelvic organs  · Avoid activities that put pressure on your pelvic muscles, such as heavy lifting. · Do pelvic floor (Kegel) exercises, which tighten and strengthen pelvic muscles. To do Kegel exercises:  ? Squeeze the same muscles you would use to stop your urine. Your belly and thighs should not move. ? Hold the squeeze for 3 seconds, and then relax for 3 seconds. ? Start with 3 seconds. Then add 1 second each week until you are able to squeeze for 10 seconds. ? Repeat the exercise 10 to 15 times a session. Do three or more sessions each day. · To ease pressure on your vagina, lie down and put a pillow under your knees. You also can lie on your side and bring your knees up to your chest.  When should you call for help? Call your doctor now or seek immediate medical care if:    · You have vaginal discharge that has increased in amount or smells bad.     · You have new or worse belly or pelvic pain. Watch closely for changes in your health, and be sure to contact your doctor if:    · You have problems with the pessary, such as vaginal pain, vaginal bleeding, or problems with urination or bowel movements. Where can you learn more? Go to [email or web address]. org and sign in to your [facility] account. Enter P751 in the [facility] box to learn more about \"Pessary: Care Instructions. \"     If you do not have an account, please click on the \"Sign Up Now\" link. Current as of: February 11, 2021               Content Version: 13.1  © 0258-1837 [employer], [employer]. Care instructions adapted under license by [facility] ([facility]).  If you have questions about a medical condition or this instruction, always ask your healthcare professional. Sebas Aragon any warranty or liability for your use of this information.

## 2022-02-17 ENCOUNTER — OFFICE VISIT (OUTPATIENT)
Dept: OBGYN CLINIC | Age: 62
End: 2022-02-17
Payer: OTHER GOVERNMENT

## 2022-02-17 VITALS
DIASTOLIC BLOOD PRESSURE: 85 MMHG | BODY MASS INDEX: 26.4 KG/M2 | WEIGHT: 149 LBS | SYSTOLIC BLOOD PRESSURE: 134 MMHG | HEART RATE: 68 BPM | HEIGHT: 63 IN

## 2022-02-17 DIAGNOSIS — N81.10 PELVIC ORGAN PROLAPSE QUANTIFICATION STAGE 3 CYSTOCELE: ICD-10-CM

## 2022-02-17 DIAGNOSIS — N36.41 HYPERMOBILITY OF URETHRA: ICD-10-CM

## 2022-02-17 DIAGNOSIS — N39.46 MIXED STRESS AND URGE URINARY INCONTINENCE: Primary | ICD-10-CM

## 2022-02-17 DIAGNOSIS — N81.10 PROLAPSE OF VAGINAL WALLS: ICD-10-CM

## 2022-02-17 PROCEDURE — 57160 INSERT PESSARY/OTHER DEVICE: CPT | Performed by: ADVANCED PRACTICE MIDWIFE

## 2022-02-17 PROCEDURE — 99213 OFFICE O/P EST LOW 20 MIN: CPT | Performed by: ADVANCED PRACTICE MIDWIFE

## 2022-02-17 RX ORDER — ESTRADIOL 0.1 MG/G
1 CREAM VAGINAL DAILY
Qty: 42.5 G | Refills: 3 | Status: SHIPPED | OUTPATIENT
Start: 2022-02-17

## 2022-02-17 NOTE — PATIENT INSTRUCTIONS
difference after trying these exercises for several weeks. Your doctor may suggest getting help from a physical therapist or recommend other treatment. Where can you learn more? Go to https://chpepiceweb.Vascular Dynamics. org and sign in to your GigPark account. Enter G203 in the Quiet Logistics box to learn more about \"Kegel Exercises: Care Instructions. \"     If you do not have an account, please click on the \"Sign Up Now\" link. Current as of: February 10, 2021               Content Version: 13.1  © 4527-4112 [employer]. Care instructions adapted under license by [facility] ([facility]). If you have questions about a medical condition or this instruction, always ask your healthcare professional. [address] any warranty or liability for your use of this information. Patient Education        Pessary: Care Instructions  Overview     A pessary is a device that fits into your vagina and supports the pelvic organs. It may be used if a pelvic organ sags or moves out of its normal position (prolapse). For some women, wearing a pessary means that they may not need to have surgery to fix a prolapse. A pessary also may help a woman who has trouble controlling her urine (incontinence). Or it may be used during a pregnancy to hold the uterus in place. There are many sizes and types of pessaries. Which type you use depends on the problem you have. Your doctor will make sure the pessary is just right for you. You may need to try different kinds to find the best fit. The pessary should hold the pelvic organ in place without causing pain or pressure. You may be able to have sex with your pessary in place. It depends on the type of pessary and your comfort level. Follow-up care is a key part of your treatment and safety. Be sure to make and go to all appointments, and call your doctor if you are having problems.  It's also a good idea to know your test results and keep a list of the medicines you take. How can you care for yourself at home? · If you get a vaginal discharge while you have a pessary, talk to your doctor about ways to reduce the discharge and smell. A pessary, in some cases, rubs the vagina and may cause irritation and discharge. If your vagina feels sore, talk to your doctor about a cream or gel to protect the vagina. · Follow your doctor's advice on how long you can wear your pessary before it needs to be cleaned. You may be able to remove and clean it yourself. Or your doctor may want to do this during an office visit. · If you clean your pessary, wash it with mild soap and water. Follow your doctor's advice on inserting the pessary. · Do not douche or use a vaginal wash unless your doctor tells you to do so. · Do not smoke. Smoking can cause a cough, which makes a prolapse worse. If you need help quitting, talk to your doctor about stop-smoking programs and medicines. These can increase your chances of quitting for good. To help support your pelvic organs  · Avoid activities that put pressure on your pelvic muscles, such as heavy lifting. · Do pelvic floor (Kegel) exercises, which tighten and strengthen pelvic muscles. To do Kegel exercises:  ? Squeeze the same muscles you would use to stop your urine. Your belly and thighs should not move. ? Hold the squeeze for 3 seconds, and then relax for 3 seconds. ? Start with 3 seconds. Then add 1 second each week until you are able to squeeze for 10 seconds. ? Repeat the exercise 10 to 15 times a session. Do three or more sessions each day. · To ease pressure on your vagina, lie down and put a pillow under your knees. You also can lie on your side and bring your knees up to your chest.  When should you call for help? Call your doctor now or seek immediate medical care if:    · You have vaginal discharge that has increased in amount or smells bad.     · You have new or worse belly or pelvic pain.    Watch closely for changes in your health, and be sure to contact your doctor if:    · You have problems with the pessary, such as vaginal pain, vaginal bleeding, or problems with urination or bowel movements. Where can you learn more? Go to https://amy.healthAAVLife. org and sign in to your Atosho account. Enter Q796 in the [facility] box to learn more about \"Pessary: Care Instructions. \"     If you do not have an account, please click on the \"Sign Up Now\" link. Current as of: February 11, 2021               Content Version: 13.1  © 2006-2021 7-bites. Care instructions adapted under license by [facility] ([facility]). If you have questions about a medical condition or this instruction, always ask your healthcare professional. [address] any warranty or liability for your use of this information. Patient Education        Bladder Training: Care Instructions  Your Care Instructions     Bladder training is used to treat urge incontinence and stress incontinence. Urge incontinence means that the need to urinate comes on so fast that you can't get to a toilet in time. Stress incontinence means that you leak urine because of pressure on your bladder. For example, it may happen when you laugh, cough, or lift something heavy. Bladder training can increase how long you can wait before you have to urinate. It can also help your bladder hold more urine. And it can give you better control over the urge to urinate. It is important to remember that bladder training takes a few weeks to a few months to make a difference. You may not see results right away, but don't give up. Follow-up care is a key part of your treatment and safety. Be sure to make and go to all appointments, and call your doctor if you are having problems. It's also a good idea to know your test results and keep a list of the medicines you take. How can you care for yourself at home?   Work with your doctor to come up with a bladder training program that is right for you. You may use one or more of the following methods. Delayed urination  · In the beginning, try to keep from urinating for 5 minutes after you first feel the need to go. · While you wait, take deep, slow breaths to relax. Kegel exercises can also help you delay the need to go to the bathroom. · After some practice, when you can easily wait 5 minutes to urinate, try to wait 10 minutes before you urinate. · Slowly increase the waiting period until you are able to control when you have to urinate. Scheduled urination  · Empty your bladder when you first wake up in the morning. · Schedule times throughout the day when you will urinate. · Start by going to the bathroom every hour, even if you don't need to go. · Slowly increase the time between trips to the bathroom. · When you have found a schedule that works well for you, keep doing it. · If you wake up during the night and have to urinate, do it. Apply your schedule to waking hours only. Kegel exercises  These tighten and strengthen pelvic muscles, which can help you control the flow of urine. To do Kegel exercises:  · Squeeze the same muscles you would use to stop your urine. Your belly and thighs should not move. · Hold the squeeze for 3 seconds, and then relax for 3 seconds. · Start with 3 seconds. Then add 1 second each week until you are able to squeeze for 10 seconds. · Repeat the exercise 10 to 15 times a session. Do three or more sessions a day. When should you call for help? Watch closely for changes in your health, and be sure to contact your doctor if:    · Your incontinence is getting worse.     · You do not get better as expected. Where can you learn more? Go to [email or web address]://righTunegeovanna.Integrated biometrics. org and sign in to your FarmLogs account. Enter Y157 in the AudioBeta box to learn more about \"Bladder Training: Care Instructions. \"     If you do not have an account, please click on the \"Sign Up Now\" link. Current as of: February 10, 2021               Content Version: 13.1  © 5375-1442 Healthwise, Incorporated. Care instructions adapted under license by [facility] ([facility]). If you have questions about a medical condition or this instruction, always ask your healthcare professional. [address] any warranty or liability for your use of this information.

## 2022-02-17 NOTE — PROGRESS NOTES
CAPS capsule TAKE 1 CAPSULE BY MOUTH 1 TIME A WEEK      alendronate (FOSAMAX) 70 MG tablet Take 70 mg by mouth every 7 days Will      losartan (COZAAR) 50 MG tablet Take 50 mg by mouth daily      hydroCHLOROthiazide (MICROZIDE) 12.5 MG capsule Take 12.5 mg by mouth daily      DICLOFENAC SODIUM PO Take 15 mg by mouth 2 times daily      aspirin 81 MG EC tablet Take 81 mg by mouth daily. No current facility-administered medications for this visit. Allergies   Allergen Reactions    Other Other (See Comments)     Steriods, had adverse reaction (Crazy)    Codeine Itching    Lisinopril      cough     Vitals:    02/17/22 1452   BP: 134/85   Pulse: 68   Weight: 149 lb (67.6 kg)   Height: 5' 3\" (1.6 m)     Body mass index is 26.39 kg/m². Review of Systems   Constitutional: Negative. HENT: Negative. Eyes: Negative. Respiratory: Negative. Cardiovascular: Negative. Gastrointestinal: Negative. Endocrine: Negative. Genitourinary: Positive for pelvic pain. Menstrual problem: improved with pessary. Musculoskeletal: Negative. Skin: Negative. Allergic/Immunologic: Negative. Neurological: Negative. Hematological: Negative. Psychiatric/Behavioral: Negative. Physical Exam  Constitutional:       Appearance: She is well-developed. She is not diaphoretic. HENT:      Head: Normocephalic and atraumatic. Nose: Nose normal.   Eyes:      Conjunctiva/sclera: Conjunctivae normal.      Pupils: Pupils are equal, round, and reactive to light. Neck:      Thyroid: No thyromegaly. Trachea: No tracheal deviation. Pulmonary:      Effort: Pulmonary effort is normal. No respiratory distress. Musculoskeletal:         General: Normal range of motion. Cervical back: Normal range of motion and neck supple. Comments: Normal ROM for upper and lower extremities. Gait steady. Skin:     General: Skin is warm and dry. Findings: No lesion or rash.    Neurological: Mental Status: She is alert and oriented to person, place, and time. Psychiatric:         Speech: Speech normal.         Behavior: Behavior normal.          Diagnosis Orders   1. Mixed stress and urge urinary incontinence     2. Hypermobility of urethra     3. Prolapse of vaginal walls     4. Pelvic organ prolapse quantification stage 3 cystocele         MEDICATIONS:  Orders Placed This Encounter   Medications    estradiol (ESTRACE VAGINAL) 0.1 MG/GM vaginal cream     Sig: Place 1 g vaginally daily     Dispense:  42.5 g     Refill:  3       ORDERS:  No orders of the defined types were placed in this encounter. PLAN:  1. Pessary fitting and maintenance - Donut replaced with #2 gelhorn placed without difficulty. Pt to return on Monday. Estrogen vaginal cream sent to help with vaginal tissue.

## 2022-02-18 ENCOUNTER — OFFICE VISIT (OUTPATIENT)
Dept: OBGYN CLINIC | Age: 62
End: 2022-02-18
Payer: OTHER GOVERNMENT

## 2022-02-18 VITALS
DIASTOLIC BLOOD PRESSURE: 78 MMHG | BODY MASS INDEX: 26.39 KG/M2 | HEART RATE: 88 BPM | SYSTOLIC BLOOD PRESSURE: 122 MMHG | WEIGHT: 149 LBS

## 2022-02-18 DIAGNOSIS — N81.4 CYSTOCELE WITH PROLAPSE: ICD-10-CM

## 2022-02-18 DIAGNOSIS — N39.46 MIXED STRESS AND URGE URINARY INCONTINENCE: ICD-10-CM

## 2022-02-18 DIAGNOSIS — N81.10 PROLAPSE OF VAGINAL WALLS: ICD-10-CM

## 2022-02-18 DIAGNOSIS — T83.9XXA PROBLEM WITH VAGINAL PESSARY, INITIAL ENCOUNTER (HCC): Primary | ICD-10-CM

## 2022-02-18 PROCEDURE — 99213 OFFICE O/P EST LOW 20 MIN: CPT | Performed by: NURSE PRACTITIONER

## 2022-02-18 NOTE — PROGRESS NOTES
[facility]  CNM Office Note    Kacy Gracia is a 57 y.o. female who presents today for her medical conditions/ complaints as noted below. Chief Complaint   Patient presents with    Other         HPI  Pt presents with c/o being very uncomfortable since having pessary fitting yesterday and is still leaking so would just like it out right now. She reports that the donut was comfortable but fell out yesterday during visit. Gellhorn initially was ok but not tolerable today. Patient Active Problem List   Diagnosis    Family history of colon cancer    History of colon polyps       No LMP recorded (lmp unknown). Patient has had a hysterectomy.       Past Medical History:   Diagnosis Date    Arthritis     Colon polyps     Hypertension     PONV (postoperative nausea and vomiting)      Past Surgical History:   Procedure Laterality Date    COLONOSCOPY      Dr Ivelisse Blanca  09/10/2012    colon polyp (5 yr)    HYSTERECTOMY  13    LAVH/ BSO    KNEE ARTHROSCOPY Right 2021    RIGHT KNEE PATELLOFEMORAL CHONDROPLASTY, PARTIAL MEDIAL MENISCECTOMY performed by Eliane Parsons MD at [address] [place] [address]       Family History   Problem Relation Age of Onset    Heart Disease Mother     Diabetes Mother     Colon Cancer Father     Liver Cancer Father     Lung Cancer Father     Colon Polyps Sister     Esophageal Cancer Neg Hx     Rectal Cancer Neg Hx     Stomach Cancer Neg Hx      Social History     Tobacco Use    Smoking status: Former Smoker     Packs/day: 0.25     Years: 30.00     Pack years: 7.50     Types: Cigarettes     Quit date: 2010     Years since quittin.1    Smokeless tobacco: Never Used   Substance Use Topics    Alcohol use: No       Current Outpatient Medications   Medication Sig Dispense Refill    estradiol (ESTRACE VAGINAL) 0.1 MG/GM vaginal cream Place 1 g vaginally daily 42.5 g 3    vitamin D (ERGOCALCIFEROL) 1.25 MG (19557 UT) CAPS capsule TAKE 1 CAPSULE BY MOUTH 1 TIME A WEEK      alendronate (FOSAMAX) 70 MG tablet Take 70 mg by mouth every 7 days Will      losartan (COZAAR) 50 MG tablet Take 50 mg by mouth daily      hydroCHLOROthiazide (MICROZIDE) 12.5 MG capsule Take 12.5 mg by mouth daily      DICLOFENAC SODIUM PO Take 15 mg by mouth 2 times daily      aspirin 81 MG EC tablet Take 81 mg by mouth daily. No current facility-administered medications for this visit. Allergies   Allergen Reactions    Other Other (See Comments)     Steriods, had adverse reaction (Crazy)    Codeine Itching    Lisinopril      cough     Vitals:    02/18/22 1326   BP: 122/78   Pulse: 88     Body mass index is 26.39 kg/m². Review of Systems   Constitutional: Negative. HENT: Negative. Eyes: Negative. Respiratory: Negative. Cardiovascular: Negative. Gastrointestinal: Negative. Endocrine: Negative. Genitourinary: Positive for enuresis, frequency, pelvic pain, urgency and vaginal pain. Negative for dysuria, vaginal bleeding and vaginal discharge. Musculoskeletal: Negative. Skin: Negative. Allergic/Immunologic: Negative. Neurological: Negative. Hematological: Negative. Psychiatric/Behavioral: Negative. Physical Exam  Vitals and nursing note reviewed. Constitutional:       Appearance: She is well-developed. HENT:      Head: Normocephalic and atraumatic. Eyes:      Conjunctiva/sclera: Conjunctivae normal.      Pupils: Pupils are equal, round, and reactive to light. Pulmonary:      Effort: Pulmonary effort is normal.   Genitourinary:     General: Normal vulva. Exam position: Lithotomy position. Vagina: Prolapsed vaginal walls present. No erythema, tenderness, bleeding or lesions. Uterus: Absent. Musculoskeletal:         General: Normal range of motion. Cervical back: Normal range of motion. Skin:     General: Skin is warm and dry.    Neurological:      Mental Status: She is alert and oriented to person, place, and time. Diagnosis Orders   1. Problem with vaginal pessary, initial encounter ([facility] [address]     2. Prolapse of vaginal walls     3. Mixed stress and urge urinary incontinence     4. Cystocele with prolapse         MEDICATIONS:  No orders of the defined types were placed in this encounter. ORDERS:  No orders of the defined types were placed in this encounter. PLAN:  1. Pessary- 2.25in Gellhorn removed without difficulty. Discussed starting the estrace cream that Marti prescribed for the next 10 days and then return to attempt fitting again with Leslie Alexis.

## 2022-02-18 NOTE — PATIENT INSTRUCTIONS
Patient Education        Kegel Exercises: Care Instructions  Overview     Kegel exercises strengthen muscles around the bladder. These muscles control the flow of urine. Kegel exercises are sometime called \"pelvic floor\" exercises. They can help prevent urine leakage and keep the pelvic organs in place. Kegel exercises can strengthen pelvic muscles that have been weakened by age, pregnancy, childbirth, and surgery. They may help prevent or treat urine leakage. You do Kegel exercises by tightening the muscles you use when you urinate. You will likely need to do these exercises for several weeks to get better. Follow-up care is a key part of your treatment and safety. Be sure to make and go to all appointments, and call your doctor if you are having problems. It's also a good idea to know your test results and keep a list of the medicines you take. How can you care for yourself at home? · Do Kegel exercises. ? Find the muscles you need to strengthen. To do this, tighten the muscles that stop your urine while you are going to the bathroom. These are the same muscles you squeeze during Kegel exercises. ? Squeeze the muscles as hard as you can. Your belly and thighs should not move. ? Hold the squeeze for 3 seconds. Then relax for 3 seconds. ? Start with 3 seconds, and then add 1 second each week until you are able to squeeze for 10 seconds. ? Repeat the exercise 10 to 15 times for each session. Do three or more sessions each day. · You can check to see if you are using the right muscles. ? Tighten the muscles that help you stop passing gas or keep you from urinating. Make sure you aren't using your stomach, leg, or buttock muscles. ? Place a finger in your vagina and squeeze around it. You are doing them right when you feel pressure around your finger. Your doctor may also suggest that you put special weights in your vagina while you do the exercises.   · Check with your doctor if you don't notice a difference after trying these exercises for several weeks. Your doctor may suggest getting help from a physical therapist or recommend other treatment. Where can you learn more? Go to https://Studio Pangeapegabbieeweb."[employer]. org and sign in to your Cretia's Creations account. Enter B563 in the PreEmptive Solutions box to learn more about \"Kegel Exercises: Care Instructions. \"     If you do not have an account, please click on the \"Sign Up Now\" link. Current as of: February 10, 2021               Content Version: 13.1  © 9668-5379 Healthwise, Incorporated. Care instructions adapted under license by [facility] ([facility]). If you have questions about a medical condition or this instruction, always ask your healthcare professional. [address] any warranty or liability for your use of this information.

## 2022-02-20 ASSESSMENT — ENCOUNTER SYMPTOMS
ALLERGIC/IMMUNOLOGIC NEGATIVE: 1
RESPIRATORY NEGATIVE: 1
EYES NEGATIVE: 1
GASTROINTESTINAL NEGATIVE: 1

## 2022-03-04 ASSESSMENT — ENCOUNTER SYMPTOMS
GASTROINTESTINAL NEGATIVE: 1
EYES NEGATIVE: 1
ALLERGIC/IMMUNOLOGIC NEGATIVE: 1
RESPIRATORY NEGATIVE: 1

## 2022-03-09 ENCOUNTER — TRANSCRIBE ORDERS (OUTPATIENT)
Dept: GENERAL RADIOLOGY | Facility: HOSPITAL | Age: 62
End: 2022-03-09

## 2022-03-09 ENCOUNTER — HOSPITAL ENCOUNTER (OUTPATIENT)
Dept: GENERAL RADIOLOGY | Facility: HOSPITAL | Age: 62
Discharge: HOME | End: 2022-03-09
Admitting: INTERNAL MEDICINE

## 2022-03-09 DIAGNOSIS — Z78.0 POST-MENOPAUSAL: ICD-10-CM

## 2022-03-09 DIAGNOSIS — Z78.0 POST-MENOPAUSAL: Primary | ICD-10-CM

## 2022-03-09 PROCEDURE — 77080 DXA BONE DENSITY AXIAL: CPT

## 2022-03-15 NOTE — PATIENT INSTRUCTIONS
Patient Education        Pessary: Care Instructions  Overview     A pessary is a device that fits into your vagina and supports the pelvic organs. It may be used if a pelvic organ sags or moves out of its normal position (prolapse). For some women, wearing a pessary means that they may not need to have surgery to fix a prolapse. A pessary also may help a woman who has trouble controlling her urine (incontinence). Or it may be used during a pregnancy to hold the uterus in place. There are many sizes and types of pessaries. Which type you use depends on the problem you have. Your doctor will make sure the pessary is just right for you. You may need to try different kinds to find the best fit. The pessary should hold the pelvic organ in place without causing pain or pressure. You may be able to have sex with your pessary in place. It depends on the type of pessary and your comfort level. Follow-up care is a key part of your treatment and safety. Be sure to make and go to all appointments, and call your doctor if you are having problems. It's also a good idea to know your test results and keep a list of the medicines you take. How can you care for yourself at home? · If you get a vaginal discharge while you have a pessary, talk to your doctor about ways to reduce the discharge and smell. A pessary, in some cases, rubs the vagina and may cause irritation and discharge. If your vagina feels sore, talk to your doctor about a cream or gel to protect the vagina. · Follow your doctor's advice on how long you can wear your pessary before it needs to be cleaned. You may be able to remove and clean it yourself. Or your doctor may want to do this during an office visit. · If you clean your pessary, wash it with mild soap and water. Follow your doctor's advice on inserting the pessary. · Do not douche or use a vaginal wash unless your doctor tells you to do so. · Do not smoke.  Smoking can cause a cough, which makes a prolapse worse. If you need help quitting, talk to your doctor about stop-smoking programs and medicines. These can increase your chances of quitting for good. To help support your pelvic organs  · Avoid activities that put pressure on your pelvic muscles, such as heavy lifting. · Do pelvic floor (Kegel) exercises, which tighten and strengthen pelvic muscles. To do Kegel exercises:  ? Squeeze the same muscles you would use to stop your urine. Your belly and thighs should not move. ? Hold the squeeze for 3 seconds, and then relax for 3 seconds. ? Start with 3 seconds. Then add 1 second each week until you are able to squeeze for 10 seconds. ? Repeat the exercise 10 to 15 times a session. Do three or more sessions each day. · To ease pressure on your vagina, lie down and put a pillow under your knees. You also can lie on your side and bring your knees up to your chest.  When should you call for help? Call your doctor now or seek immediate medical care if:    · You have vaginal discharge that has increased in amount or smells bad.     · You have new or worse belly or pelvic pain. Watch closely for changes in your health, and be sure to contact your doctor if:    · You have problems with the pessary, such as vaginal pain, vaginal bleeding, or problems with urination or bowel movements. Where can you learn more? Go to https://INTICA Biomedicalpedestinieb.Abacus e-Media. org and sign in to your Striped Sail account. Enter W956 in the Quote Roller box to learn more about \"Pessary: Care Instructions. \"     If you do not have an account, please click on the \"Sign Up Now\" link. Current as of: February 11, 2021               Content Version: 13.1  © 6412-0094 Healthwise, Incorporated. Care instructions adapted under license by [facility] ([facility]).  If you have questions about a medical condition or this instruction, always ask your healthcare professional. Se[place] any warranty or liability for your use of this information.

## 2022-03-23 ENCOUNTER — OFFICE VISIT (OUTPATIENT)
Dept: OBGYN CLINIC | Age: 62
End: 2022-03-23
Payer: OTHER GOVERNMENT

## 2022-03-23 VITALS
HEIGHT: 63 IN | HEART RATE: 79 BPM | SYSTOLIC BLOOD PRESSURE: 110 MMHG | BODY MASS INDEX: 26.05 KG/M2 | WEIGHT: 147 LBS | DIASTOLIC BLOOD PRESSURE: 78 MMHG

## 2022-03-23 DIAGNOSIS — T83.9XXD PROBLEM WITH VAGINAL PESSARY, SUBSEQUENT ENCOUNTER: Primary | ICD-10-CM

## 2022-03-23 DIAGNOSIS — N81.4 CYSTOCELE WITH PROLAPSE: ICD-10-CM

## 2022-03-23 DIAGNOSIS — N39.46 MIXED STRESS AND URGE URINARY INCONTINENCE: ICD-10-CM

## 2022-03-23 DIAGNOSIS — N81.10 PROLAPSE OF VAGINAL WALLS: ICD-10-CM

## 2022-03-23 PROCEDURE — 57160 INSERT PESSARY/OTHER DEVICE: CPT | Performed by: ADVANCED PRACTICE MIDWIFE

## 2022-03-23 PROCEDURE — 99213 OFFICE O/P EST LOW 20 MIN: CPT | Performed by: ADVANCED PRACTICE MIDWIFE

## 2022-03-23 ASSESSMENT — ENCOUNTER SYMPTOMS
EYES NEGATIVE: 1
ALLERGIC/IMMUNOLOGIC NEGATIVE: 1
GASTROINTESTINAL NEGATIVE: 1
RESPIRATORY NEGATIVE: 1

## 2022-03-23 NOTE — PROGRESS NOTES
[facility]  CNM Office Note    Vi Olson is a 57 y.o. female who presents today for her medical conditions/ complaints as noted below. Chief Complaint   Patient presents with    Other     pessary maintenance; Just FYI of my note I had in patient's chart: Eliazar Sherman on  and she wanted her Gelhorn out; is going to try estrogen and come back in 10 days. Size was 2 . \"          CAN Cardenas presents for pessary fitting. She has tried donut (which came out soon after insertion) and gelhorn (which caused terrible discomfort). She c/o vaginal burning/tension. \"I don't have the time right now to do surgery\". Patient Active Problem List   Diagnosis    Family history of colon cancer    History of colon polyps       No LMP recorded (lmp unknown). Patient has had a hysterectomy.       Past Medical History:   Diagnosis Date    Arthritis     Colon polyps     Hypertension     PONV (postoperative nausea and vomiting)      Past Surgical History:   Procedure Laterality Date    COLONOSCOPY      Dr Emanuel Benitez  09/10/2012    colon polyp (5 yr)    HYSTERECTOMY  13    LAVH/ BSO    KNEE ARTHROSCOPY Right 2021    RIGHT KNEE PATELLOFEMORAL CHONDROPLASTY, PARTIAL MEDIAL MENISCECTOMY performed by Matt Cano MD at [address] [place] [address]       Family History   Problem Relation Age of Onset    Heart Disease Mother     Diabetes Mother     Colon Cancer Father     Liver Cancer Father     Lung Cancer Father     Colon Polyps Sister     Esophageal Cancer Neg Hx     Rectal Cancer Neg Hx     Stomach Cancer Neg Hx      Social History     Tobacco Use    Smoking status: Former Smoker     Packs/day: 0.25     Years: 30.00     Pack years: 7.50     Types: Cigarettes     Quit date: 2010     Years since quittin.2    Smokeless tobacco: Never Used   Substance Use Topics    Alcohol use: No       Current Outpatient Medications   Medication Sig Dispense Refill    estradiol (ESTRACE VAGINAL) 0.1 MG/GM vaginal cream Place 1 g vaginally daily 42.5 g 3    vitamin D (ERGOCALCIFEROL) 1.25 MG (77258 UT) CAPS capsule TAKE 1 CAPSULE BY MOUTH 1 TIME A WEEK      alendronate (FOSAMAX) 70 MG tablet Take 70 mg by mouth every 7 days Will      losartan (COZAAR) 50 MG tablet Take 50 mg by mouth daily      hydroCHLOROthiazide (MICROZIDE) 12.5 MG capsule Take 12.5 mg by mouth daily      DICLOFENAC SODIUM PO Take 15 mg by mouth 2 times daily      aspirin 81 MG EC tablet Take 81 mg by mouth daily. No current facility-administered medications for this visit. Allergies   Allergen Reactions    Other Other (See Comments)     Steriods, had adverse reaction (Crazy)    Codeine Itching    Lisinopril      cough     Vitals:    03/23/22 1453 03/23/22 1521   BP: (!) 147/81 110/78   Site: Right Upper Arm Left Upper Arm   Position: Sitting Sitting   Cuff Size: Medium Adult Large Adult   Pulse: 79    Weight: 147 lb (66.7 kg)    Height: 5' 3\" (1.6 m)      Body mass index is 26.04 kg/m². Review of Systems   Constitutional: Negative. HENT: Negative. Eyes: Negative. Respiratory: Negative. Cardiovascular: Negative. Gastrointestinal: Negative. Endocrine: Negative. Genitourinary: Positive for vaginal pain. Musculoskeletal: Negative. Skin: Negative. Allergic/Immunologic: Negative. Neurological: Negative. Hematological: Negative. Psychiatric/Behavioral: Negative. Physical Exam  Constitutional:       Appearance: She is well-developed. She is not diaphoretic. HENT:      Head: Normocephalic and atraumatic. Nose: Nose normal.   Eyes:      Conjunctiva/sclera: Conjunctivae normal.      Pupils: Pupils are equal, round, and reactive to light. Neck:      Thyroid: No thyromegaly. Trachea: No tracheal deviation. Pulmonary:      Effort: Pulmonary effort is normal. No respiratory distress. Genitourinary:     General: Normal vulva.       Comments: Grade IV vaginal prolapse  Musculoskeletal:         General: Normal range of motion. Cervical back: Normal range of motion and neck supple. Comments: Normal ROM for upper and lower extremities. Gait steady. Skin:     General: Skin is warm and dry. Findings: No lesion or rash. Neurological:      Mental Status: She is alert and oriented to person, place, and time. Psychiatric:         Speech: Speech normal.         Behavior: Behavior normal.          Diagnosis Orders   1. Problem with vaginal pessary, subsequent encounter     2. Prolapse of vaginal walls     3. Mixed stress and urge urinary incontinence     4. Cystocele with prolapse         MEDICATIONS:  No orders of the defined types were placed in this encounter. ORDERS:  No orders of the defined types were placed in this encounter. PLAN:  1. Vaginal prolapse - #3 cube placed. I advised pt to continue estrogen cream nightly and to not leave this in place over 48 -72 hours at a time. She was asked to let me know if it is a good fit. If so we will order one for her.

## 2022-03-25 NOTE — PATIENT INSTRUCTIONS
Patient Education        Pessary: Care Instructions  Overview     A pessary is a device that fits into your vagina and supports the pelvic organs. It may be used if a pelvic organ sags or moves out of its normal position (prolapse). For some women, wearing a pessary means that they may not need to have surgery to fix a prolapse. A pessary also may help a woman who has trouble controlling her urine (incontinence). Or it may be used during a pregnancy to hold the uterus in place. There are many sizes and types of pessaries. Which type you use depends on the problem you have. Your doctor will make sure the pessary is just right for you. You may need to try different kinds to find the best fit. The pessary should hold the pelvic organ in place without causing pain or pressure. You may be able to have sex with your pessary in place. It depends on the type of pessary and your comfort level. Follow-up care is a key part of your treatment and safety. Be sure to make and go to all appointments, and call your doctor if you are having problems. It's also a good idea to know your test results and keep a list of the medicines you take. How can you care for yourself at home? · If you get a vaginal discharge while you have a pessary, talk to your doctor about ways to reduce the discharge and smell. A pessary, in some cases, rubs the vagina and may cause irritation and discharge. If your vagina feels sore, talk to your doctor about a cream or gel to protect the vagina. · Follow your doctor's advice on how long you can wear your pessary before it needs to be cleaned. You may be able to remove and clean it yourself. Or your doctor may want to do this during an office visit. · If you clean your pessary, wash it with mild soap and water. Follow your doctor's advice on inserting the pessary. · Do not douche or use a vaginal wash unless your doctor tells you to do so. · Do not smoke.  Smoking can cause a cough, which makes a prolapse worse. If you need help quitting, talk to your doctor about stop-smoking programs and medicines. These can increase your chances of quitting for good. To help support your pelvic organs  · Avoid activities that put pressure on your pelvic muscles, such as heavy lifting. · Do pelvic floor (Kegel) exercises, which tighten and strengthen pelvic muscles. To do Kegel exercises:  ? Squeeze the same muscles you would use to stop your urine. Your belly and thighs should not move. ? Hold the squeeze for 3 seconds, and then relax for 3 seconds. ? Start with 3 seconds. Then add 1 second each week until you are able to squeeze for 10 seconds. ? Repeat the exercise 10 to 15 times a session. Do three or more sessions each day. · To ease pressure on your vagina, lie down and put a pillow under your knees. You also can lie on your side and bring your knees up to your chest.  When should you call for help? Call your doctor now or seek immediate medical care if:    · You have vaginal discharge that has increased in amount or smells bad.     · You have new or worse belly or pelvic pain. Watch closely for changes in your health, and be sure to contact your doctor if:    · You have problems with the pessary, such as vaginal pain, vaginal bleeding, or problems with urination or bowel movements. Where can you learn more? Go to [email or web address]://[email or web address] org and sign in to your [employer] account. Enter I496 in the Outright box to learn more about \"Pessary: Care Instructions. \"     If you do not have an account, please click on the \"Sign Up Now\" link. Current as of: February 11, 2021               Content Version: 13.1  © 2433-6666 [employer], [employer]. Care instructions adapted under license by [facility] ([facility]).  If you have questions about a medical condition or this instruction, always ask your healthcare professional. Sebas Aragon any warranty or liability for your use of this information.

## 2022-03-28 ENCOUNTER — OFFICE VISIT (OUTPATIENT)
Dept: OBGYN CLINIC | Age: 62
End: 2022-03-28
Payer: OTHER GOVERNMENT

## 2022-03-28 VITALS
DIASTOLIC BLOOD PRESSURE: 78 MMHG | HEIGHT: 63 IN | HEART RATE: 77 BPM | BODY MASS INDEX: 26.05 KG/M2 | SYSTOLIC BLOOD PRESSURE: 118 MMHG | WEIGHT: 147 LBS

## 2022-03-28 DIAGNOSIS — Z46.89 FITTING AND ADJUSTMENT OF PESSARY: ICD-10-CM

## 2022-03-28 DIAGNOSIS — T83.9XXD PROBLEM WITH VAGINAL PESSARY, SUBSEQUENT ENCOUNTER: Primary | ICD-10-CM

## 2022-03-28 PROCEDURE — 99213 OFFICE O/P EST LOW 20 MIN: CPT | Performed by: ADVANCED PRACTICE MIDWIFE

## 2022-03-28 RX ORDER — DICLOFENAC SODIUM 75 MG/1
TABLET, DELAYED RELEASE ORAL
COMMUNITY
Start: 2022-03-09

## 2022-03-28 ASSESSMENT — ENCOUNTER SYMPTOMS
EYES NEGATIVE: 1
ALLERGIC/IMMUNOLOGIC NEGATIVE: 1
RESPIRATORY NEGATIVE: 1
GASTROINTESTINAL NEGATIVE: 1

## 2022-03-28 NOTE — PROGRESS NOTES
[facility]  CNM Office Note    Yenny Elaine is a 57 y.o. female who presents today for her medical conditions/ complaints as noted below. Chief Complaint   Patient presents with    Other     pessary not working; it did hold everything up but she just peed ALL the time. It didn't hurt her but it's not working. Its hard to get it out. Doesn't know if she need to stop fooling with it and give up or what to do? HPI  Surjit Shelley presents for pessary fitting. She had difficulty with removing cube. She is still using vaginal estrogen. Best symptom management was with #2 donut, feels it \"just needed to be a bit bigger\". Desires #3. Patient Active Problem List   Diagnosis    Family history of colon cancer    History of colon polyps       No LMP recorded (lmp unknown). Patient has had a hysterectomy.       Past Medical History:   Diagnosis Date    Arthritis     Colon polyps     Hypertension     PONV (postoperative nausea and vomiting)      Past Surgical History:   Procedure Laterality Date    COLONOSCOPY      Dr Abraham Notice  09/10/2012    colon polyp (5 yr)    HYSTERECTOMY  13    LAVH/ BSO    KNEE ARTHROSCOPY Right 2021    RIGHT KNEE PATELLOFEMORAL CHONDROPLASTY, PARTIAL MEDIAL MENISCECTOMY performed by Lis Rojas MD at [address] [facility] [address]       Family History   Problem Relation Age of Onset    Heart Disease Mother     Diabetes Mother     Colon Cancer Father     Liver Cancer Father     Lung Cancer Father     Colon Polyps Sister     Esophageal Cancer Neg Hx     Rectal Cancer Neg Hx     Stomach Cancer Neg Hx      Social History     Tobacco Use    Smoking status: Former Smoker     Packs/day: 0.25     Years: 30.00     Pack years: 7.50     Types: Cigarettes     Quit date: 2010     Years since quittin.2    Smokeless tobacco: Never Used   Substance Use Topics    Alcohol use: No       Current Outpatient Medications   Medication Sig Dispense Refill    diclofenac (VOLTAREN) 75 MG EC tablet TAKE 1 TABLET BY MOUTH TWICE DAILY      estradiol (ESTRACE VAGINAL) 0.1 MG/GM vaginal cream Place 1 g vaginally daily 42.5 g 3    vitamin D (ERGOCALCIFEROL) 1.25 MG (41135 UT) CAPS capsule TAKE 1 CAPSULE BY MOUTH 1 TIME A WEEK      losartan (COZAAR) 50 MG tablet Take 50 mg by mouth daily      hydroCHLOROthiazide (MICROZIDE) 12.5 MG capsule Take 12.5 mg by mouth daily      aspirin 81 MG EC tablet Take 81 mg by mouth daily.  alendronate (FOSAMAX) 70 MG tablet Take 70 mg by mouth every 7 days Will (Patient not taking: Reported on 3/28/2022)       No current facility-administered medications for this visit. Allergies   Allergen Reactions    Other Other (See Comments)     Steriods, had adverse reaction (Crazy)    Codeine Itching    Lisinopril      cough     Vitals:    03/28/22 0951   BP: 118/78   Site: Left Upper Arm   Position: Sitting   Cuff Size: Medium Adult   Pulse: 77   Weight: 147 lb (66.7 kg)   Height: 5' 3\" (1.6 m)     Body mass index is 26.04 kg/m². Review of Systems   Constitutional: Negative. HENT: Negative. Eyes: Negative. Respiratory: Negative. Cardiovascular: Negative. Gastrointestinal: Negative. Endocrine: Negative. Genitourinary: Positive for pelvic pain (incontinence) and vaginal pain. Musculoskeletal: Negative. Skin: Negative. Allergic/Immunologic: Negative. Neurological: Negative. Hematological: Negative. Psychiatric/Behavioral: Negative. Physical Exam  Constitutional:       Appearance: She is well-developed. She is not diaphoretic. HENT:      Head: Normocephalic and atraumatic. Nose: Nose normal.   Eyes:      Conjunctiva/sclera: Conjunctivae normal.      Pupils: Pupils are equal, round, and reactive to light. Neck:      Thyroid: No thyromegaly. Trachea: No tracheal deviation. Pulmonary:      Effort: Pulmonary effort is normal. No respiratory distress.    Musculoskeletal: General: Normal range of motion. Cervical back: Normal range of motion and neck supple. Comments: Normal ROM for upper and lower extremities. Gait steady. Skin:     General: Skin is warm and dry. Findings: No lesion or rash. Neurological:      Mental Status: She is alert and oriented to person, place, and time. Psychiatric:         Speech: Speech normal.         Behavior: Behavior normal.          Diagnosis Orders   1. Problem with vaginal pessary, subsequent encounter     2. Fitting and adjustment of pessary         MEDICATIONS:  No orders of the defined types were placed in this encounter. ORDERS:  No orders of the defined types were placed in this encounter. PLAN:  1. Pt did not like cube or gelhorn. Liked donut but #2 came out. Desires to try #3 donut. Placed without difficulty. Will order if she likes this one.

## 2022-04-04 ENCOUNTER — TELEPHONE (OUTPATIENT)
Dept: OBGYN CLINIC | Age: 62
End: 2022-04-04

## 2022-04-04 NOTE — TELEPHONE ENCOUNTER
Pt called and was wanting a return call did not specify what was going on. Left vm and sent mychart to pt.  Teresita/SAMSON

## 2022-09-08 DIAGNOSIS — M81.0 OSTEOPOROSIS, UNSPECIFIED OSTEOPOROSIS TYPE, UNSPECIFIED PATHOLOGICAL FRACTURE PRESENCE: Primary | ICD-10-CM

## 2022-09-29 ENCOUNTER — TRANSCRIBE ORDERS (OUTPATIENT)
Dept: ADMINISTRATIVE | Facility: HOSPITAL | Age: 62
End: 2022-09-29

## 2022-09-29 DIAGNOSIS — Z12.31 SCREENING MAMMOGRAM FOR BREAST CANCER: Primary | ICD-10-CM

## 2023-03-29 ENCOUNTER — TELEPHONE (OUTPATIENT)
Dept: GASTROENTEROLOGY | Age: 63
End: 2023-03-29

## 2023-04-17 ENCOUNTER — HOSPITAL ENCOUNTER (OUTPATIENT)
Dept: WOMENS IMAGING | Age: 63
Discharge: HOME | End: 2023-04-17
Payer: OTHER GOVERNMENT

## 2023-04-17 DIAGNOSIS — Z12.31 ENCOUNTER FOR SCREENING MAMMOGRAM FOR MALIGNANT NEOPLASM OF BREAST: ICD-10-CM

## 2023-04-17 PROCEDURE — 77067 SCR MAMMO BI INCL CAD: CPT | Performed by: RADIOLOGY

## 2023-04-17 PROCEDURE — 77063 BREAST TOMOSYNTHESIS BI: CPT

## 2023-07-31 ENCOUNTER — TELEPHONE (OUTPATIENT)
Dept: GASTROENTEROLOGY | Age: 63
End: 2023-07-31

## 2023-07-31 NOTE — TELEPHONE ENCOUNTER
Called patient to remind them of their procedure with Dr. Eliu Wilson  at [facility]  on 8/3/23 to arrive at 7:00AM     NO ANS / LM     Reminded patient if she does not have prep instructions & prescription, she will need to stop by our office to       [address]

## 2023-09-07 RX ORDER — SODIUM CHLORIDE 9 MG/ML
250 INJECTION, SOLUTION INTRAVENOUS ONCE
OUTPATIENT
Start: 2023-09-18

## 2023-09-07 RX ORDER — ZOLEDRONIC ACID 5 MG/100ML
5 INJECTION, SOLUTION INTRAVENOUS ONCE
OUTPATIENT
Start: 2023-09-18

## 2024-05-14 ENCOUNTER — APPOINTMENT (OUTPATIENT)
Dept: GENERAL RADIOLOGY | Age: 64
End: 2024-05-14
Payer: OTHER GOVERNMENT

## 2024-05-14 ENCOUNTER — HOSPITAL ENCOUNTER (EMERGENCY)
Age: 64
Discharge: HOME | End: 2024-05-14
Payer: OTHER GOVERNMENT

## 2024-05-14 VITALS
TEMPERATURE: 97.8 F | SYSTOLIC BLOOD PRESSURE: 108 MMHG | RESPIRATION RATE: 19 BRPM | WEIGHT: 138 LBS | HEIGHT: 61 IN | BODY MASS INDEX: 26.06 KG/M2 | HEART RATE: 93 BPM | OXYGEN SATURATION: 97 % | DIASTOLIC BLOOD PRESSURE: 84 MMHG

## 2024-05-14 DIAGNOSIS — R06.89 DYSPNEA AND RESPIRATORY ABNORMALITIES: Primary | ICD-10-CM

## 2024-05-14 DIAGNOSIS — R06.00 DYSPNEA AND RESPIRATORY ABNORMALITIES: Primary | ICD-10-CM

## 2024-05-14 DIAGNOSIS — R42 DIZZINESS: ICD-10-CM

## 2024-05-14 DIAGNOSIS — J06.9 ACUTE UPPER RESPIRATORY INFECTION: ICD-10-CM

## 2024-05-14 LAB
ALBUMIN SERPL-MCNC: 3.7 G/DL (ref 3.5–5.2)
ALP SERPL-CCNC: 93 U/L (ref 35–104)
ALT SERPL-CCNC: 6 U/L (ref 5–33)
ANION GAP SERPL CALCULATED.3IONS-SCNC: 12 MMOL/L (ref 7–19)
AST SERPL-CCNC: 10 U/L (ref 5–32)
BASOPHILS # BLD: 0.1 K/UL (ref 0–0.2)
BASOPHILS NFR BLD: 0.7 % (ref 0–1)
BILIRUB SERPL-MCNC: 0.4 MG/DL (ref 0.2–1.2)
BNP BLD-MCNC: 125 PG/ML (ref 0–124)
BUN SERPL-MCNC: 13 MG/DL (ref 8–23)
CALCIUM SERPL-MCNC: 9.4 MG/DL (ref 8.8–10.2)
CHLORIDE SERPL-SCNC: 98 MMOL/L (ref 98–111)
CO2 SERPL-SCNC: 27 MMOL/L (ref 22–29)
CREAT SERPL-MCNC: 1.1 MG/DL (ref 0.5–0.9)
D DIMER PPP FEU-MCNC: 0.44 UG/ML FEU (ref 0–0.48)
EKG P AXIS: -21 DEGREES
EKG P-R INTERVAL: 136 MS
EKG Q-T INTERVAL: 354 MS
EKG QRS DURATION: 98 MS
EKG QTC CALCULATION (BAZETT): 401 MS
EKG T AXIS: -13 DEGREES
EOSINOPHIL # BLD: 0.1 K/UL (ref 0–0.6)
EOSINOPHIL NFR BLD: 0.5 % (ref 0–5)
ERYTHROCYTE [DISTWIDTH] IN BLOOD BY AUTOMATED COUNT: 12.2 % (ref 11.5–14.5)
GLUCOSE SERPL-MCNC: 132 MG/DL (ref 74–109)
HCT VFR BLD AUTO: 40.1 % (ref 37–47)
HGB BLD-MCNC: 13.3 G/DL (ref 12–16)
IMM GRANULOCYTES # BLD: 0.1 K/UL
LYMPHOCYTES # BLD: 1.8 K/UL (ref 1.1–4.5)
LYMPHOCYTES NFR BLD: 14.6 % (ref 20–40)
MCH RBC QN AUTO: 31.4 PG (ref 27–31)
MCHC RBC AUTO-ENTMCNC: 33.2 G/DL (ref 33–37)
MCV RBC AUTO: 94.8 FL (ref 81–99)
MONOCYTES # BLD: 0.9 K/UL (ref 0–0.9)
MONOCYTES NFR BLD: 7.1 % (ref 0–10)
NEUTROPHILS # BLD: 9.3 K/UL (ref 1.5–7.5)
NEUTS SEG NFR BLD: 76.4 % (ref 50–65)
PLATELET # BLD AUTO: 308 K/UL (ref 130–400)
PMV BLD AUTO: 10.3 FL (ref 9.4–12.3)
POTASSIUM SERPL-SCNC: 2.9 MMOL/L (ref 3.5–5)
PROT SERPL-MCNC: 7 G/DL (ref 6.6–8.7)
RBC # BLD AUTO: 4.23 M/UL (ref 4.2–5.4)
REASON FOR REJECTION: NORMAL
REJECTED TEST: NORMAL
SODIUM SERPL-SCNC: 137 MMOL/L (ref 136–145)
TROPONIN, HIGH SENSITIVITY: 13 NG/L (ref 0–14)
WBC # BLD AUTO: 12.2 K/UL (ref 4.8–10.8)

## 2024-05-14 PROCEDURE — 36415 COLL VENOUS BLD VENIPUNCTURE: CPT

## 2024-05-14 PROCEDURE — 99285 EMERGENCY DEPT VISIT HI MDM: CPT

## 2024-05-14 PROCEDURE — 85025 COMPLETE CBC W/AUTO DIFF WBC: CPT

## 2024-05-14 PROCEDURE — 84484 ASSAY OF TROPONIN QUANT: CPT

## 2024-05-14 PROCEDURE — 80053 COMPREHEN METABOLIC PANEL: CPT

## 2024-05-14 PROCEDURE — 93010 ELECTROCARDIOGRAM REPORT: CPT | Performed by: INTERNAL MEDICINE

## 2024-05-14 PROCEDURE — 93005 ELECTROCARDIOGRAM TRACING: CPT | Performed by: PHYSICIAN ASSISTANT

## 2024-05-14 PROCEDURE — 71045 X-RAY EXAM CHEST 1 VIEW: CPT

## 2024-05-14 PROCEDURE — 83880 ASSAY OF NATRIURETIC PEPTIDE: CPT

## 2024-05-14 PROCEDURE — 85379 FIBRIN DEGRADATION QUANT: CPT

## 2024-05-14 RX ORDER — BENZONATATE 200 MG/1
200 CAPSULE ORAL 3 TIMES DAILY PRN
Qty: 21 CAPSULE | Refills: 0 | Status: SHIPPED | OUTPATIENT
Start: 2024-05-14 | End: 2024-05-21

## 2024-05-14 RX ORDER — ALBUTEROL SULFATE 90 UG/1
2 AEROSOL, METERED RESPIRATORY (INHALATION) 4 TIMES DAILY PRN
Qty: 18 G | Refills: 0 | Status: SHIPPED | OUTPATIENT
Start: 2024-05-14

## 2024-05-14 ASSESSMENT — ENCOUNTER SYMPTOMS
SHORTNESS OF BREATH: 1
COUGH: 1
VOMITING: 0
DIARRHEA: 0
NAUSEA: 1
CONSTIPATION: 0
ABDOMINAL PAIN: 0

## 2024-05-14 ASSESSMENT — LIFESTYLE VARIABLES
HOW OFTEN DO YOU HAVE A DRINK CONTAINING ALCOHOL: NEVER
HOW MANY STANDARD DRINKS CONTAINING ALCOHOL DO YOU HAVE ON A TYPICAL DAY: PATIENT DOES NOT DRINK

## 2024-05-14 NOTE — ED PROVIDER NOTES
Number of children: None    Years of education: None    Highest education level: None   Tobacco Use    Smoking status: Former     Current packs/day: 0.00     Average packs/day: 0.3 packs/day for 30.0 years (7.5 ttl pk-yrs)     Types: Cigarettes     Start date: 1980     Quit date: 2010     Years since quittin.3    Smokeless tobacco: Never   Vaping Use    Vaping Use: Never used   Substance and Sexual Activity    Alcohol use: No    Drug use: No    Sexual activity: Yes     Partners: Male       SCREENINGS    Tara Coma Scale  Eye Opening: Spontaneous  Best Verbal Response: Oriented  Best Motor Response: Obeys commands  Tara Coma Scale Score: 15        PHYSICAL EXAM    (up to 7 for level 4, 8 or more for level 5)     ED Triage Vitals [24 0900]   BP Temp Temp Source Pulse Respirations SpO2 Height Weight - Scale   108/84 97.8 °F (36.6 °C) Oral 93 19 97 % 1.549 m (5' 1\") 62.6 kg (138 lb)       Physical Exam  Vitals and nursing note reviewed.   Constitutional:       General: She is not in acute distress.     Appearance: She is well-developed and normal weight. She is not ill-appearing, toxic-appearing or diaphoretic.   HENT:      Head: Normocephalic and atraumatic.   Neck:      Vascular: No JVD.   Cardiovascular:      Rate and Rhythm: Normal rate and regular rhythm.      Pulses: Normal pulses.      Heart sounds: Normal heart sounds.   Pulmonary:      Effort: Pulmonary effort is normal. No tachypnea or respiratory distress.      Breath sounds: Normal breath sounds. No decreased breath sounds, wheezing, rhonchi or rales.   Chest:      Chest wall: No tenderness.   Abdominal:      Palpations: Abdomen is soft.      Tenderness: There is no abdominal tenderness.   Musculoskeletal:      Cervical back: Normal range of motion and neck supple.      Right lower leg: No tenderness. No edema.      Left lower leg: No tenderness. No edema.   Lymphadenopathy:      Cervical: No cervical adenopathy.   Skin:

## 2024-05-17 ENCOUNTER — APPOINTMENT (OUTPATIENT)
Dept: GENERAL RADIOLOGY | Age: 64
End: 2024-05-17
Payer: OTHER GOVERNMENT

## 2024-05-17 ENCOUNTER — HOSPITAL ENCOUNTER (EMERGENCY)
Age: 64
Discharge: HOME | End: 2024-05-17
Attending: EMERGENCY MEDICINE
Payer: OTHER GOVERNMENT

## 2024-05-17 VITALS
OXYGEN SATURATION: 97 % | DIASTOLIC BLOOD PRESSURE: 80 MMHG | TEMPERATURE: 98 F | RESPIRATION RATE: 18 BRPM | HEART RATE: 67 BPM | SYSTOLIC BLOOD PRESSURE: 105 MMHG

## 2024-05-17 DIAGNOSIS — R05.9 COUGH, UNSPECIFIED TYPE: Primary | ICD-10-CM

## 2024-05-17 LAB
ALBUMIN SERPL-MCNC: 3.7 G/DL (ref 3.5–5.2)
ALP SERPL-CCNC: 86 U/L (ref 35–104)
ALT SERPL-CCNC: 6 U/L (ref 5–33)
ANION GAP SERPL CALCULATED.3IONS-SCNC: 12 MMOL/L (ref 7–19)
AST SERPL-CCNC: 15 U/L (ref 5–32)
B PARAP IS1001 DNA NPH QL NAA+NON-PROBE: NOT DETECTED
B PERT.PT PRMT NPH QL NAA+NON-PROBE: NOT DETECTED
BASOPHILS # BLD: 0.1 K/UL (ref 0–0.2)
BASOPHILS NFR BLD: 0.8 % (ref 0–1)
BILIRUB SERPL-MCNC: 0.2 MG/DL (ref 0.2–1.2)
BUN SERPL-MCNC: 14 MG/DL (ref 8–23)
C PNEUM DNA NPH QL NAA+NON-PROBE: NOT DETECTED
CALCIUM SERPL-MCNC: 8.8 MG/DL (ref 8.8–10.2)
CHLORIDE SERPL-SCNC: 99 MMOL/L (ref 98–111)
CO2 SERPL-SCNC: 29 MMOL/L (ref 22–29)
CREAT SERPL-MCNC: 1 MG/DL (ref 0.5–0.9)
EOSINOPHIL # BLD: 0.2 K/UL (ref 0–0.6)
EOSINOPHIL NFR BLD: 2.3 % (ref 0–5)
ERYTHROCYTE [DISTWIDTH] IN BLOOD BY AUTOMATED COUNT: 12.1 % (ref 11.5–14.5)
FLUAV RNA NPH QL NAA+NON-PROBE: NOT DETECTED
FLUBV RNA NPH QL NAA+NON-PROBE: NOT DETECTED
GLUCOSE SERPL-MCNC: 150 MG/DL (ref 74–109)
HADV DNA NPH QL NAA+NON-PROBE: NOT DETECTED
HCOV 229E RNA NPH QL NAA+NON-PROBE: NOT DETECTED
HCOV HKU1 RNA NPH QL NAA+NON-PROBE: NOT DETECTED
HCOV NL63 RNA NPH QL NAA+NON-PROBE: NOT DETECTED
HCOV OC43 RNA NPH QL NAA+NON-PROBE: NOT DETECTED
HCT VFR BLD AUTO: 39.2 % (ref 37–47)
HGB BLD-MCNC: 13 G/DL (ref 12–16)
HMPV RNA NPH QL NAA+NON-PROBE: NOT DETECTED
HPIV1 RNA NPH QL NAA+NON-PROBE: NOT DETECTED
HPIV2 RNA NPH QL NAA+NON-PROBE: NOT DETECTED
HPIV3 RNA NPH QL NAA+NON-PROBE: NOT DETECTED
HPIV4 RNA NPH QL NAA+NON-PROBE: NOT DETECTED
IMM GRANULOCYTES # BLD: 0.1 K/UL
LYMPHOCYTES # BLD: 1.8 K/UL (ref 1.1–4.5)
LYMPHOCYTES NFR BLD: 20.5 % (ref 20–40)
M PNEUMO DNA NPH QL NAA+NON-PROBE: NOT DETECTED
MCH RBC QN AUTO: 31.3 PG (ref 27–31)
MCHC RBC AUTO-ENTMCNC: 33.2 G/DL (ref 33–37)
MCV RBC AUTO: 94.2 FL (ref 81–99)
MONOCYTES # BLD: 0.8 K/UL (ref 0–0.9)
MONOCYTES NFR BLD: 8.5 % (ref 0–10)
NEUTROPHILS # BLD: 5.9 K/UL (ref 1.5–7.5)
NEUTS SEG NFR BLD: 66.9 % (ref 50–65)
PLATELET # BLD AUTO: 308 K/UL (ref 130–400)
PMV BLD AUTO: 9.9 FL (ref 9.4–12.3)
POTASSIUM SERPL-SCNC: 3.3 MMOL/L (ref 3.5–5)
PROT SERPL-MCNC: 6.7 G/DL (ref 6.6–8.7)
RBC # BLD AUTO: 4.16 M/UL (ref 4.2–5.4)
RSV RNA NPH QL NAA+NON-PROBE: NOT DETECTED
RV+EV RNA NPH QL NAA+NON-PROBE: NOT DETECTED
SARS-COV-2 RNA NPH QL NAA+NON-PROBE: NOT DETECTED
SODIUM SERPL-SCNC: 140 MMOL/L (ref 136–145)
WBC # BLD AUTO: 8.9 K/UL (ref 4.8–10.8)

## 2024-05-17 PROCEDURE — 36415 COLL VENOUS BLD VENIPUNCTURE: CPT

## 2024-05-17 PROCEDURE — 99285 EMERGENCY DEPT VISIT HI MDM: CPT

## 2024-05-17 PROCEDURE — 71045 X-RAY EXAM CHEST 1 VIEW: CPT

## 2024-05-17 PROCEDURE — 93005 ELECTROCARDIOGRAM TRACING: CPT | Performed by: EMERGENCY MEDICINE

## 2024-05-17 PROCEDURE — 85025 COMPLETE CBC W/AUTO DIFF WBC: CPT

## 2024-05-17 PROCEDURE — 80053 COMPREHEN METABOLIC PANEL: CPT

## 2024-05-17 PROCEDURE — 0202U NFCT DS 22 TRGT SARS-COV-2: CPT

## 2024-05-17 PROCEDURE — 6370000000 HC RX 637 (ALT 250 FOR IP): Performed by: EMERGENCY MEDICINE

## 2024-05-17 RX ORDER — AZITHROMYCIN 250 MG/1
TABLET, FILM COATED ORAL
Qty: 1 PACKET | Refills: 0 | Status: SHIPPED | OUTPATIENT
Start: 2024-05-17 | End: 2024-05-21

## 2024-05-17 RX ADMIN — POTASSIUM BICARBONATE 40 MEQ: 782 TABLET, EFFERVESCENT ORAL at 13:53

## 2024-05-17 ASSESSMENT — ENCOUNTER SYMPTOMS
SORE THROAT: 0
BACK PAIN: 0
ABDOMINAL PAIN: 0
COUGH: 1
VOMITING: 0
RHINORRHEA: 0
DIARRHEA: 0
SHORTNESS OF BREATH: 1

## 2024-05-17 NOTE — ED PROVIDER NOTES
[facility] EMERGENCY DEPT  eMERGENCY dEPARTMENT eNCOUnter      Pt Name: Addie Car  MRN: [id number]  Birthdate 1960  Date of evaluation: 5/17/2024  Provider: YOLANDA MICHELLE MD    CHIEF COMPLAINT       Chief Complaint   Patient presents with    Cough     Pt states she has been having coughing spells that makes her sob.  Pt has been seen by pcp and placed on abx.          HISTORY OF PRESENT ILLNESS   (Location/Symptom, Timing/Onset,Context/Setting, Quality, Duration, Modifying Factors, Severity)  Note limiting factors.   Addie Car is a 63 y.o. female who presents to the emergency department for cough.  Patient states that she has had somewhat of a chronic cough the past month but more worse the past 5 days.  She is a former smoker and has lived with smokers but no diagnosis of COPD.  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  She was started on Omnicef this past Monday and was seen here 3 days ago with negative cardiac workup including D-dimer and negative chest x-ray.  She states her cough sometimes is so severe will make her feel lightheaded and dizzy and almost like she is going to pass out but has not syncopized.  She denies any chest pain.  She really just wants some help control her cough.  Has been using her Tessalon Perles and albuterol inhaler without much relief.    HPI    NursingNotes were reviewed.    REVIEW OF SYSTEMS    (2-9 systems for level 4, 10 or more for level 5)     Review of Systems   Constitutional:  Positive for fatigue. Negative for chills and fever.   HENT:  Negative for rhinorrhea and sore throat.    Respiratory:  Positive for cough and shortness of breath.    Cardiovascular:  Negative for chest pain and leg swelling.   Gastrointestinal:  Negative for abdominal pain, diarrhea, nausea and vomiting.   Genitourinary:  Negative for dysuria, frequency and urgency.   Musculoskeletal:  Negative for back pain and neck pain.   Neurological:  Positive for dizziness and

## 2024-05-18 LAB
EKG P AXIS: 80 DEGREES
EKG P-R INTERVAL: 122 MS
EKG Q-T INTERVAL: 412 MS
EKG QRS DURATION: 88 MS
EKG QTC CALCULATION (BAZETT): 404 MS
EKG T AXIS: 85 DEGREES

## 2024-05-18 PROCEDURE — 93010 ELECTROCARDIOGRAM REPORT: CPT | Performed by: INTERNAL MEDICINE

## 2024-09-18 RX ORDER — SODIUM CHLORIDE 9 MG/ML
250 INJECTION, SOLUTION INTRAVENOUS ONCE
OUTPATIENT
Start: 2024-09-18

## 2024-09-18 RX ORDER — ZOLEDRONIC ACID 5 MG/100ML
5 INJECTION, SOLUTION INTRAVENOUS ONCE
OUTPATIENT
Start: 2024-09-18

## 2024-09-26 ENCOUNTER — HOSPITAL ENCOUNTER (EMERGENCY)
Age: 64
Discharge: HOME | End: 2024-09-26
Attending: EMERGENCY MEDICINE
Payer: MEDICARE

## 2024-09-26 ENCOUNTER — TELEPHONE (OUTPATIENT)
Dept: GASTROENTEROLOGY | Age: 64
End: 2024-09-26

## 2024-09-26 VITALS
TEMPERATURE: 97.9 F | WEIGHT: 145 LBS | BODY MASS INDEX: 27.4 KG/M2 | DIASTOLIC BLOOD PRESSURE: 84 MMHG | HEART RATE: 83 BPM | SYSTOLIC BLOOD PRESSURE: 146 MMHG | OXYGEN SATURATION: 96 % | RESPIRATION RATE: 18 BRPM

## 2024-09-26 DIAGNOSIS — R03.0 ELEVATED BLOOD PRESSURE READING: ICD-10-CM

## 2024-09-26 DIAGNOSIS — Z00.00 NORMAL EXAM: Primary | ICD-10-CM

## 2024-09-26 PROCEDURE — 99282 EMERGENCY DEPT VISIT SF MDM: CPT

## 2024-09-26 ASSESSMENT — ENCOUNTER SYMPTOMS
VOMITING: 0
ABDOMINAL PAIN: 0
DIARRHEA: 0
SHORTNESS OF BREATH: 0
EYE PAIN: 0
BACK PAIN: 0

## 2024-09-26 NOTE — TELEPHONE ENCOUNTER
Please call Addie back to screen her for CLN appt after 2:00 PM   Ph# [phone]        Thank you

## 2024-10-02 ENCOUNTER — TELEPHONE (OUTPATIENT)
Dept: GASTROENTEROLOGY | Age: 64
End: 2024-10-02

## 2024-10-02 NOTE — TELEPHONE ENCOUNTER
Patient LM that she has been trying to reach us to sched CLN, she received a no reach letter in the mail as we have also been trying to reach her.  There was a note to call patient after 2:00PM, tried to call today at 3:45, no ans - LM asking patient to call back

## 2025-01-07 ENCOUNTER — TRANSCRIBE ORDERS (OUTPATIENT)
Dept: LAB | Facility: HOSPITAL | Age: 65
End: 2025-01-07
Payer: OTHER GOVERNMENT

## 2025-01-07 DIAGNOSIS — M81.0 OSTEOPOROSIS, SENILE: Primary | ICD-10-CM

## 2025-02-24 ENCOUNTER — LAB (OUTPATIENT)
Dept: LAB | Facility: HOSPITAL | Age: 65
End: 2025-02-24
Payer: MEDICARE

## 2025-02-24 ENCOUNTER — INFUSION (OUTPATIENT)
Dept: ONCOLOGY | Facility: HOSPITAL | Age: 65
End: 2025-02-24
Payer: MEDICARE

## 2025-02-24 VITALS
TEMPERATURE: 96.8 F | SYSTOLIC BLOOD PRESSURE: 121 MMHG | DIASTOLIC BLOOD PRESSURE: 65 MMHG | WEIGHT: 155.8 LBS | HEART RATE: 62 BPM | OXYGEN SATURATION: 100 % | RESPIRATION RATE: 18 BRPM | BODY MASS INDEX: 27.6 KG/M2

## 2025-02-24 DIAGNOSIS — M81.0 OSTEOPOROSIS, SENILE: ICD-10-CM

## 2025-02-24 DIAGNOSIS — M81.0 OSTEOPOROSIS, UNSPECIFIED OSTEOPOROSIS TYPE, UNSPECIFIED PATHOLOGICAL FRACTURE PRESENCE: Primary | ICD-10-CM

## 2025-02-24 LAB
ANION GAP SERPL CALCULATED.3IONS-SCNC: 12 MMOL/L (ref 5–15)
BUN SERPL-MCNC: 29 MG/DL (ref 8–23)
BUN/CREAT SERPL: 32.6 (ref 7–25)
CALCIUM SPEC-SCNC: 8.9 MG/DL (ref 8.6–10.5)
CHLORIDE SERPL-SCNC: 101 MMOL/L (ref 98–107)
CO2 SERPL-SCNC: 27 MMOL/L (ref 22–29)
CREAT SERPL-MCNC: 0.89 MG/DL (ref 0.57–1)
EGFRCR SERPLBLD CKD-EPI 2021: 72.1 ML/MIN/1.73
GLUCOSE SERPL-MCNC: 121 MG/DL (ref 65–99)
MAGNESIUM SERPL-MCNC: 1.8 MG/DL (ref 1.6–2.4)
PHOSPHATE SERPL-MCNC: 4.1 MG/DL (ref 2.5–4.5)
POTASSIUM SERPL-SCNC: 3.6 MMOL/L (ref 3.5–5.2)
SODIUM SERPL-SCNC: 140 MMOL/L (ref 136–145)

## 2025-02-24 PROCEDURE — 25010000002 ZOLEDRONIC ACID 5 MG/100ML SOLUTION: Performed by: INTERNAL MEDICINE

## 2025-02-24 PROCEDURE — 96374 THER/PROPH/DIAG INJ IV PUSH: CPT

## 2025-02-24 PROCEDURE — 80048 BASIC METABOLIC PNL TOTAL CA: CPT

## 2025-02-24 PROCEDURE — 83735 ASSAY OF MAGNESIUM: CPT

## 2025-02-24 PROCEDURE — 84100 ASSAY OF PHOSPHORUS: CPT

## 2025-02-24 PROCEDURE — 36415 COLL VENOUS BLD VENIPUNCTURE: CPT

## 2025-02-24 RX ORDER — SODIUM CHLORIDE 9 MG/ML
250 INJECTION, SOLUTION INTRAVENOUS ONCE
OUTPATIENT
Start: 2026-02-25

## 2025-02-24 RX ORDER — ZOLEDRONIC ACID 0.05 MG/ML
5 INJECTION, SOLUTION INTRAVENOUS ONCE
Status: CANCELLED | OUTPATIENT
Start: 2026-02-25

## 2025-02-24 RX ORDER — ZOLEDRONIC ACID 0.05 MG/ML
5 INJECTION, SOLUTION INTRAVENOUS ONCE
Status: COMPLETED | OUTPATIENT
Start: 2025-02-24 | End: 2025-02-24

## 2025-02-24 RX ADMIN — ZOLEDRONIC ACID 5 MG: 5 INJECTION, SOLUTION INTRAVENOUS at 15:54

## 2025-05-02 ENCOUNTER — TRANSCRIBE ORDERS (OUTPATIENT)
Dept: ADMINISTRATIVE | Facility: HOSPITAL | Age: 65
End: 2025-05-02
Payer: OTHER GOVERNMENT

## 2025-05-02 DIAGNOSIS — R51.9 HEADACHE DISORDER: Primary | ICD-10-CM

## 2025-05-27 ENCOUNTER — TRANSCRIBE ORDERS (OUTPATIENT)
Dept: ADMINISTRATIVE | Facility: HOSPITAL | Age: 65
End: 2025-05-27
Payer: OTHER GOVERNMENT

## 2025-05-27 DIAGNOSIS — Z12.2 SCREENING FOR LUNG CANCER: ICD-10-CM

## 2025-05-27 DIAGNOSIS — Z87.891 FORMER SMOKER: Primary | ICD-10-CM

## 2025-06-20 ENCOUNTER — HOSPITAL ENCOUNTER (OUTPATIENT)
Dept: CT IMAGING | Facility: HOSPITAL | Age: 65
Discharge: HOME | End: 2025-06-20
Payer: MEDICARE

## 2025-06-20 DIAGNOSIS — Z87.891 FORMER SMOKER: ICD-10-CM

## 2025-06-20 DIAGNOSIS — Z12.2 SCREENING FOR LUNG CANCER: ICD-10-CM

## 2025-06-20 DIAGNOSIS — R51.9 HEADACHE DISORDER: ICD-10-CM

## 2025-06-20 PROCEDURE — 71271 CT THORAX LUNG CANCER SCR C-: CPT

## 2025-06-20 PROCEDURE — 70450 CT HEAD/BRAIN W/O DYE: CPT

## (undated) DEVICE — GLOVE SURG SZ 7 CRM LTX FREE POLYISOPRENE POLYMER BEAD ANTI

## (undated) DEVICE — SUPER MULTIVAC 50 WITH INTEGRATED                                    FINGER SWITCHES IFS: Brand: COBLATION

## (undated) DEVICE — SURGICAL PROCEDURE PACK LOWER EXTREMITY LOURDES HOSP

## (undated) DEVICE — GLOVE SURG SZ 65 CRM LTX FREE POLYISOPRENE POLYMER BEAD ANTI

## (undated) DEVICE — TUBING PMP IRRIG GOFLO

## (undated) DEVICE — Z INVALID PART USE 2392642 LARYNGOSCOPE VID TI LO PROF S3 BLDE SPECTRM GLIDESCOPE GO

## (undated) DEVICE — ZIMMER® STERILE DISPOSABLE TOURNIQUET CUFF WITH PLC, DUAL PORT, SINGLE BLADDER, 34 IN. (86 CM)

## (undated) DEVICE — UNDERGLOVE SURG SZ 8 FNGR THK0.21MIL GRN LTX BEAD CUF

## (undated) DEVICE — BANDAGE COMPR W3INXL15FT BGE E SGL LAYERED CLP CLSR

## (undated) DEVICE — STERILE POLYISOPRENE POWDER-FREE SURGICAL GLOVES: Brand: PROTEXIS

## (undated) DEVICE — Z INACTIVE USE 2660664 SOLUTION IRRIG 3000ML 0.9% SOD CHL USP UROMATIC PLAS CONT

## (undated) DEVICE — SUTURE ETHLN SZ 3-0 L18IN NONABSORBABLE BLK FS-1 L24MM 3/8 663H

## (undated) DEVICE — TUBING, SUCTION, 1/4" X 20', STRAIGHT: Brand: MEDLINE INDUSTRIES, INC.

## (undated) DEVICE — 3.5 MM FULL RADIUS ELITE STRAIGHT                                    DISPOSABLE BLADES, BEIGE,PACKAGED 6                                    PER BOX, STERILE